# Patient Record
Sex: MALE | Race: WHITE | NOT HISPANIC OR LATINO | Employment: FULL TIME | URBAN - METROPOLITAN AREA
[De-identification: names, ages, dates, MRNs, and addresses within clinical notes are randomized per-mention and may not be internally consistent; named-entity substitution may affect disease eponyms.]

---

## 2017-04-02 ENCOUNTER — HOSPITAL ENCOUNTER (EMERGENCY)
Facility: HOSPITAL | Age: 37
Discharge: HOME | End: 2017-04-02
Attending: EMERGENCY MEDICINE | Admitting: EMERGENCY MEDICINE
Payer: COMMERCIAL

## 2017-04-02 VITALS
WEIGHT: 150.4 LBS | OXYGEN SATURATION: 100 % | TEMPERATURE: 98.5 F | HEART RATE: 80 BPM | DIASTOLIC BLOOD PRESSURE: 74 MMHG | RESPIRATION RATE: 18 BRPM | SYSTOLIC BLOOD PRESSURE: 131 MMHG

## 2017-04-02 DIAGNOSIS — M54.9 BACK PAIN: Primary | ICD-10-CM

## 2017-04-02 PROCEDURE — 99283 EMERGENCY DEPT VISIT LOW MDM: CPT

## 2017-04-02 RX ORDER — PREDNISONE 20 MG/1
60 TABLET ORAL ONCE
Status: COMPLETED | OUTPATIENT
Start: 2017-04-02 | End: 2017-04-02

## 2017-04-02 RX ORDER — IBUPROFEN 200 MG
200 TABLET ORAL EVERY 6 HOURS PRN
COMMUNITY

## 2017-04-02 RX ORDER — DIAZEPAM 5 MG/1
7.5 TABLET ORAL ONCE
Status: COMPLETED | OUTPATIENT
Start: 2017-04-02 | End: 2017-04-02

## 2017-04-02 RX ORDER — DIAZEPAM 5 MG/1
5 TABLET ORAL EVERY 8 HOURS PRN
Qty: 15 TABLET | Refills: 0 | Status: SHIPPED | OUTPATIENT
Start: 2017-04-02 | End: 2017-04-07

## 2017-04-02 RX ORDER — PREDNISONE 20 MG/1
20 TABLET ORAL 2 TIMES DAILY
Qty: 10 TABLET | Refills: 0 | Status: SHIPPED | OUTPATIENT
Start: 2017-04-02 | End: 2017-04-07

## 2017-04-02 RX ADMIN — DIAZEPAM 7.5 MG: 5 TABLET ORAL at 11:39

## 2017-04-02 RX ADMIN — PREDNISONE 60 MG: 20 TABLET ORAL at 11:38

## 2018-10-23 ENCOUNTER — HOSPITAL ENCOUNTER (EMERGENCY)
Facility: HOSPITAL | Age: 38
Discharge: HOME | End: 2018-10-23
Attending: EMERGENCY MEDICINE | Admitting: EMERGENCY MEDICINE

## 2018-10-23 VITALS
SYSTOLIC BLOOD PRESSURE: 127 MMHG | BODY MASS INDEX: 19.11 KG/M2 | RESPIRATION RATE: 18 BRPM | DIASTOLIC BLOOD PRESSURE: 67 MMHG | HEART RATE: 82 BPM | HEIGHT: 71 IN | OXYGEN SATURATION: 100 % | TEMPERATURE: 96.7 F | WEIGHT: 136.5 LBS

## 2018-10-23 DIAGNOSIS — Z59.00 HOMELESSNESS: Primary | ICD-10-CM

## 2018-10-23 PROCEDURE — 99284 EMERGENCY DEPT VISIT MOD MDM: CPT

## 2018-10-23 RX ORDER — ACETAMINOPHEN 325 MG/1
650 TABLET ORAL ONCE
Status: DISCONTINUED | OUTPATIENT
Start: 2018-10-23 | End: 2018-10-23 | Stop reason: HOSPADM

## 2018-10-23 SDOH — ECONOMIC STABILITY - HOUSING INSECURITY: HOMELESSNESS UNSPECIFIED: Z59.00

## 2018-10-23 NOTE — ED NOTES
Patient continuing to sleep in room  Respirations appear even and unlabored       Roberto Zee RN  10/23/18 9329

## 2018-10-23 NOTE — DISCHARGE INSTRUCTIONS
Please follow-up with the primary care provider for further care , if symptoms worsen please return to the emergency department    Paresthesia   WHAT YOU NEED TO KNOW:   Paresthesia is numbness and tingling  It can happen in any part of your body, but usually occurs in your legs, feet, arms, or hands  There are a large number of conditions that can cause paresthesia  It affects the nerves that provide sensation and happens when there are changes in nerves or nerve pathways  These changes can be temporary, such as if you take certain medicines or you are not getting enough vitamin B  Paresthesia can become permanent when there is nerve damage  Conditions that may cause nerve damage include diabetes, carpel tunnel syndrome, stroke, and multiple sclerosis  The exact cause of your paresthesia may be unknown  DISCHARGE INSTRUCTIONS:   Medicines:  Ask for more information about medicines you may need to treat the condition causing your paresthesias  · NSAIDs  help decrease swelling and pain or fever  This medicine is available with or without a doctor's order  NSAIDs can cause stomach bleeding or kidney problems in certain people  If you take blood thinner medicine, always ask your healthcare provider if NSAIDs are safe for you  Always read the medicine label and follow directions  · Take your medicine as directed  Contact your healthcare provider if you think your medicine is not helping or if you have side effects  Tell him or her if you are allergic to any medicine  Keep a list of the medicines, vitamins, and herbs you take  Include the amounts, and when and why you take them  Bring the list or the pill bottles to follow-up visits  Carry your medicine list with you in case of an emergency  Follow up with your healthcare provider or neurologist as directed:  Write down your questions so you remember to ask them during your visits    Contact your healthcare provider or neurologist if:   · Your symptoms do not improve  · You have symptoms in more than one part of your body  · You have questions about your condition or care  Return to the emergency department if:   · You have any of the following signs of a stroke:      ¨ Numbness or drooping on one side of your face     ¨ Weakness in an arm or leg    ¨ Confusion or difficulty speaking    ¨ Dizziness, a severe headache, or vision loss    · You are not able to control your urine or bowel movements  · You have severe pain along with numbness and tingling  · Your legs suddenly become cold  You have trouble moving your legs, and they ache  · You have increased weakness in a part of your body  · You have uncontrolled movements, or you have a seizure  © 2017 [address]  Information is for End User's use only and may not be sold, redistributed or otherwise used for commercial purposes  All illustrations and images included in CareNotes® are the copyrighted property of A D A M , Inc  or Mathew Osborne  The above information is an  only  It is not intended as medical advice for individual conditions or treatments  Talk to your doctor, nurse or pharmacist before following any medical regimen to see if it is safe and effective for you
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
## 2019-06-08 ENCOUNTER — TRANSCRIBE ORDERS (OUTPATIENT)
Dept: ADMINISTRATIVE | Facility: HOSPITAL | Age: 39
End: 2019-06-08

## 2019-06-08 ENCOUNTER — APPOINTMENT (OUTPATIENT)
Dept: LAB | Age: 39
End: 2019-06-08
Payer: COMMERCIAL

## 2019-06-08 DIAGNOSIS — F19.21 COMBINATIONS OF DRUG DEPENDENCE EXCLUDING OPIOID TYPE DRUG, IN REMISSION (HCC): ICD-10-CM

## 2019-06-08 DIAGNOSIS — Z20.5 EXPOSURE TO HEPATITIS B: ICD-10-CM

## 2019-06-08 DIAGNOSIS — Z00.00 ROUTINE GENERAL MEDICAL EXAMINATION AT A HEALTH CARE FACILITY: ICD-10-CM

## 2019-06-08 DIAGNOSIS — E55.9 AVITAMINOSIS D: ICD-10-CM

## 2019-06-08 DIAGNOSIS — Z00.00 ROUTINE GENERAL MEDICAL EXAMINATION AT A HEALTH CARE FACILITY: Primary | ICD-10-CM

## 2019-06-08 LAB
25(OH)D3 SERPL-MCNC: 18.7 NG/ML (ref 30–100)
ALBUMIN SERPL BCP-MCNC: 3.9 G/DL (ref 3.5–5)
ALP SERPL-CCNC: 128 U/L (ref 46–116)
ALT SERPL W P-5'-P-CCNC: 65 U/L (ref 12–78)
ANION GAP SERPL CALCULATED.3IONS-SCNC: 2 MMOL/L (ref 4–13)
AST SERPL W P-5'-P-CCNC: 28 U/L (ref 5–45)
BASOPHILS # BLD AUTO: 0.13 THOUSANDS/ΜL (ref 0–0.1)
BASOPHILS NFR BLD AUTO: 1 % (ref 0–1)
BILIRUB DIRECT SERPL-MCNC: 0.09 MG/DL (ref 0–0.2)
BILIRUB SERPL-MCNC: 0.28 MG/DL (ref 0.2–1)
BUN SERPL-MCNC: 21 MG/DL (ref 5–25)
CALCIUM SERPL-MCNC: 8.6 MG/DL (ref 8.3–10.1)
CHLORIDE SERPL-SCNC: 111 MMOL/L (ref 100–108)
CHOLEST SERPL-MCNC: 95 MG/DL (ref 50–200)
CO2 SERPL-SCNC: 28 MMOL/L (ref 21–32)
CREAT SERPL-MCNC: 1.05 MG/DL (ref 0.6–1.3)
EOSINOPHIL # BLD AUTO: 0.51 THOUSAND/ΜL (ref 0–0.61)
EOSINOPHIL NFR BLD AUTO: 4 % (ref 0–6)
ERYTHROCYTE [DISTWIDTH] IN BLOOD BY AUTOMATED COUNT: 13.1 % (ref 11.6–15.1)
GFR SERPL CREATININE-BSD FRML MDRD: 90 ML/MIN/1.73SQ M
GLUCOSE P FAST SERPL-MCNC: 125 MG/DL (ref 65–99)
HCT VFR BLD AUTO: 48.7 % (ref 36.5–49.3)
HDLC SERPL-MCNC: 36 MG/DL (ref 40–60)
HGB BLD-MCNC: 16.6 G/DL (ref 12–17)
IMM GRANULOCYTES # BLD AUTO: 0.04 THOUSAND/UL (ref 0–0.2)
IMM GRANULOCYTES NFR BLD AUTO: 0 % (ref 0–2)
LDLC SERPL CALC-MCNC: 29 MG/DL (ref 0–100)
LYMPHOCYTES # BLD AUTO: 2.1 THOUSANDS/ΜL (ref 0.6–4.47)
LYMPHOCYTES NFR BLD AUTO: 16 % (ref 14–44)
MCH RBC QN AUTO: 31.3 PG (ref 26.8–34.3)
MCHC RBC AUTO-ENTMCNC: 34.1 G/DL (ref 31.4–37.4)
MCV RBC AUTO: 92 FL (ref 82–98)
MONOCYTES # BLD AUTO: 0.96 THOUSAND/ΜL (ref 0.17–1.22)
MONOCYTES NFR BLD AUTO: 7 % (ref 4–12)
NEUTROPHILS # BLD AUTO: 9.76 THOUSANDS/ΜL (ref 1.85–7.62)
NEUTS SEG NFR BLD AUTO: 72 % (ref 43–75)
NONHDLC SERPL-MCNC: 59 MG/DL
NRBC BLD AUTO-RTO: 0 /100 WBCS
PLATELET # BLD AUTO: 186 THOUSANDS/UL (ref 149–390)
PMV BLD AUTO: 11.9 FL (ref 8.9–12.7)
POTASSIUM SERPL-SCNC: 4.6 MMOL/L (ref 3.5–5.3)
PROT SERPL-MCNC: 7 G/DL (ref 6.4–8.2)
RBC # BLD AUTO: 5.3 MILLION/UL (ref 3.88–5.62)
SODIUM SERPL-SCNC: 141 MMOL/L (ref 136–145)
TRIGL SERPL-MCNC: 151 MG/DL
WBC # BLD AUTO: 13.5 THOUSAND/UL (ref 4.31–10.16)

## 2019-06-08 PROCEDURE — 82306 VITAMIN D 25 HYDROXY: CPT

## 2019-06-08 PROCEDURE — 80053 COMPREHEN METABOLIC PANEL: CPT

## 2019-06-08 PROCEDURE — 36415 COLL VENOUS BLD VENIPUNCTURE: CPT

## 2019-06-08 PROCEDURE — 85025 COMPLETE CBC W/AUTO DIFF WBC: CPT

## 2019-06-08 PROCEDURE — 82248 BILIRUBIN DIRECT: CPT

## 2019-06-08 PROCEDURE — 80061 LIPID PANEL: CPT

## 2021-06-27 ENCOUNTER — OFFICE VISIT (OUTPATIENT)
Dept: URGENT CARE | Age: 41
End: 2021-06-27
Payer: COMMERCIAL

## 2021-06-27 VITALS
HEIGHT: 71 IN | DIASTOLIC BLOOD PRESSURE: 85 MMHG | RESPIRATION RATE: 18 BRPM | WEIGHT: 150 LBS | SYSTOLIC BLOOD PRESSURE: 134 MMHG | OXYGEN SATURATION: 96 % | HEART RATE: 83 BPM | TEMPERATURE: 97.9 F | BODY MASS INDEX: 21 KG/M2

## 2021-06-27 DIAGNOSIS — L23.7 POISON IVY DERMATITIS: Primary | ICD-10-CM

## 2021-06-27 PROCEDURE — G0382 LEV 3 HOSP TYPE B ED VISIT: HCPCS | Performed by: PHYSICIAN ASSISTANT

## 2021-06-27 RX ORDER — PREDNISONE 10 MG/1
TABLET ORAL
Qty: 30 TABLET | Refills: 0 | Status: SHIPPED | OUTPATIENT
Start: 2021-06-27
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
## 2021-06-27 NOTE — PATIENT INSTRUCTIONS
Continue to monitor symptoms  If new or worsening symptoms develop, go immediately to Er  Drink plenty of fluids  Follow up with Family Doctor this week  Poison Ivy   WHAT YOU NEED TO KNOW:   Poison ivy is a plant that can cause an itchy, uncomfortable rash on your skin  Poison ivy grows as a shrub or vine in woods, fields, and areas of thick [place]  It has 3 bright green leaves on each stem that turn red in jaimie  DISCHARGE INSTRUCTIONS:   Medicines:   · Antiseptic or drying creams or ointments: These medicines may be used to dry out the rash and decrease the itching  These products may be available without a doctor's order  · Steroids: This medicine helps decrease itching and inflammation  It can be given as a cream to apply to your skin or as a pill  · Antihistamines: This medicine may help decrease itching and help you sleep  It is available without a doctor's order  · Take your medicine as directed  Contact your healthcare provider if you think your medicine is not helping or if you have side effects  Tell him of her if you are allergic to any medicine  Keep a list of the medicines, vitamins, and herbs you take  Include the amounts, and when and why you take them  Bring the list or the pill bottles to follow-up visits  Carry your medicine list with you in case of an emergency  Follow up with your healthcare provider as directed:  Write down your questions so you remember to ask them during your visits  How your poison ivy rash spreads: You cannot spread poison ivy by touching your rash or the liquid from your blisters  Poison ivy is spread only if you scratch your skin while it still has oil on it  You may think your rash is spreading because new rashes appear over a number of days  This happens because areas covered by thin skin break out in a rash first  Your face or forearms may develop a rash before thicker areas, such as the palms of your hands     Self-care:   · Keep your rash clean and dry:  Wash it with soap and water  Gently pat it dry with a clean towel  · Try not to scratch or rub your rash: This can cause your skin to become infected  · Use a compress on your rash:  Dip a clean washcloth in cool water  Wring it out and place it on your rash  Leave the washcloth on your skin for 15 minutes  Do this at least 3 times per day  · Take a cornstarch or oatmeal bath: If your rash is too large to cover with wet washcloths, take 3 or 4 cornstarch baths daily  Mix 1 pound of cornstarch with a little water to make a paste  Add the paste to a tub full of water and mix well  You may also use colloidal oatmeal in the bath water  Use lukewarm water  Avoid hot water because it may cause your itching to increase  Prevent a poison ivy rash in the future:   · Wear skin protection:  Wear long pants, a long-sleeved shirt, and gloves  Use a skin block lotion to protect your skin from poison ivy oil  You can find this at a drugstore without a prescription  · Wash clothing after possible exposure: If you think you have been near a poison ivy plant, wash the clothes you were wearing separately from other clothes  Rinse the washing machine well after you take the clothes out  Scrub boots and shoes with warm, soapy water  Dry clean items and clothing that you cannot wash in water  Poison ivy oil is sticky and can stay on surfaces for a long time  It can cause a new rash even years later  · Bathe your pet:  Use warm water and shampoo on your pet's fur  This will prevent the spread of oil to your skin, car, and home  Wear long sleeves, long pants, and gloves while washing pets or any items that may have oil on them  · Reduce exposure to poison ivy:  Do not touch plants that look like poison ivy  Keep your yard free of poison ivy  While protecting your skin, remove the plant and the roots  Place them in a plastic bag and seal the bag tightly       · Do not burn poison ivy plants: This can spread the oil through the air  If you breathe the oil into your lungs, you could have swelling and serious breathing problems  Oil that clings to the fire maggie can land on your skin and cause a rash  Contact your healthcare provider if:   · You have pus, soft yellow scabs, or tenderness on the rash  · The itching gets worse or keeps you awake at night  · The rash covers more than 1/4 of your skin or spreads to your eyes, mouth, or genital area  · The rash is not better after 2 to 3 weeks  · You have tender, swollen glands on the sides of your neck  · You have swelling in your arms and legs  · You have questions or concerns about your condition or care  Return to the emergency department if:   · You have a fever  · You have redness, swelling, and tenderness around the rash  · You have trouble breathing  © Copyright [address] Information is for End User's use only and may not be sold, redistributed or otherwise used for commercial purposes  All illustrations and images included in CareNotes® are the copyrighted property of A D A M , Inc  or [facility] Michael Mendoza   The above information is an  only  It is not intended as medical advice for individual conditions or treatments  Talk to your doctor, nurse or pharmacist before following any medical regimen to see if it is safe and effective for you

## 2023-02-07 ENCOUNTER — HOSPITAL ENCOUNTER (INPATIENT)
Facility: HOSPITAL | Age: 43
LOS: 12 days | Discharge: HOME | End: 2023-02-20
Attending: EMERGENCY MEDICINE | Admitting: PSYCHIATRY & NEUROLOGY

## 2023-02-07 DIAGNOSIS — Z00.8 MEDICAL CLEARANCE FOR PSYCHIATRIC ADMISSION: ICD-10-CM

## 2023-02-07 DIAGNOSIS — F32.2 CURRENT SEVERE EPISODE OF MAJOR DEPRESSIVE DISORDER WITHOUT PSYCHOTIC FEATURES WITHOUT PRIOR EPISODE (HCC): Primary | ICD-10-CM

## 2023-02-07 DIAGNOSIS — F10.20 ALCOHOL USE DISORDER, SEVERE, DEPENDENCE (HCC): ICD-10-CM

## 2023-02-07 DIAGNOSIS — F32.A DEPRESSION: ICD-10-CM

## 2023-02-07 DIAGNOSIS — R45.89 THOUGHTS OF SELF HARM: ICD-10-CM

## 2023-02-07 LAB
AMPHETAMINES SERPL QL SCN: POSITIVE
BARBITURATES UR QL: NEGATIVE
BENZODIAZ UR QL: NEGATIVE
COCAINE UR QL: NEGATIVE
ETHANOL EXG-MCNC: 0 MG/DL
FLUAV RNA RESP QL NAA+PROBE: NEGATIVE
FLUBV RNA RESP QL NAA+PROBE: NEGATIVE
METHADONE UR QL: NEGATIVE
OPIATES UR QL SCN: NEGATIVE
OXYCODONE+OXYMORPHONE UR QL SCN: NEGATIVE
PCP UR QL: NEGATIVE
RSV RNA RESP QL NAA+PROBE: NEGATIVE
SARS-COV-2 RNA RESP QL NAA+PROBE: NEGATIVE
THC UR QL: POSITIVE

## 2023-02-07 RX ORDER — LORAZEPAM 1 MG/1
2 TABLET ORAL EVERY 2 HOUR PRN
Status: DISCONTINUED | OUTPATIENT
Start: 2023-02-07 | End: 2023-02-08

## 2023-02-07 RX ADMIN — LORAZEPAM 2 MG: 1 TABLET ORAL at 11:37

## 2023-02-07 RX ADMIN — LORAZEPAM 2 MG: 1 TABLET ORAL at 23:18
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
## 2023-02-08 LAB
FLUAV RNA RESP QL NAA+PROBE: NEGATIVE
FLUBV RNA RESP QL NAA+PROBE: NEGATIVE
RSV RNA RESP QL NAA+PROBE: NEGATIVE
SARS-COV-2 RNA RESP QL NAA+PROBE: NEGATIVE

## 2023-02-08 RX ORDER — BENZTROPINE MESYLATE 1 MG/1
1 TABLET ORAL 2 TIMES DAILY PRN
Status: DISCONTINUED | OUTPATIENT
Start: 2023-02-08 | End: 2023-02-20 | Stop reason: HOSPADM

## 2023-02-08 RX ORDER — BENZTROPINE MESYLATE 1 MG/ML
1 INJECTION INTRAMUSCULAR; INTRAVENOUS
Status: DISCONTINUED | OUTPATIENT
Start: 2023-02-08 | End: 2023-02-20 | Stop reason: HOSPADM

## 2023-02-08 RX ORDER — HALOPERIDOL 1 MG/1
1 TABLET ORAL EVERY 6 HOURS PRN
Status: DISCONTINUED | OUTPATIENT
Start: 2023-02-08 | End: 2023-02-20 | Stop reason: HOSPADM

## 2023-02-08 RX ORDER — HALOPERIDOL 5 MG/ML
2.5 INJECTION INTRAMUSCULAR
Status: DISCONTINUED | OUTPATIENT
Start: 2023-02-08 | End: 2023-02-20 | Stop reason: HOSPADM

## 2023-02-08 RX ORDER — NICOTINE 21 MG/24HR
14 PATCH, TRANSDERMAL 24 HOURS TRANSDERMAL ONCE
Status: COMPLETED | OUTPATIENT
Start: 2023-02-08 | End: 2023-02-09

## 2023-02-08 RX ORDER — MAGNESIUM HYDROXIDE/ALUMINUM HYDROXICE/SIMETHICONE 120; 1200; 1200 MG/30ML; MG/30ML; MG/30ML
30 SUSPENSION ORAL EVERY 4 HOURS PRN
Status: DISCONTINUED | OUTPATIENT
Start: 2023-02-08 | End: 2023-02-20 | Stop reason: HOSPADM

## 2023-02-08 RX ORDER — BENZTROPINE MESYLATE 1 MG/ML
0.5 INJECTION INTRAMUSCULAR; INTRAVENOUS
Status: DISCONTINUED | OUTPATIENT
Start: 2023-02-08 | End: 2023-02-20 | Stop reason: HOSPADM

## 2023-02-08 RX ORDER — PROPRANOLOL HYDROCHLORIDE 10 MG/1
10 TABLET ORAL EVERY 8 HOURS PRN
Status: DISCONTINUED | OUTPATIENT
Start: 2023-02-08 | End: 2023-02-20 | Stop reason: HOSPADM

## 2023-02-08 RX ORDER — LORAZEPAM 1 MG/1
2 TABLET ORAL ONCE
Status: COMPLETED | OUTPATIENT
Start: 2023-02-08 | End: 2023-02-08

## 2023-02-08 RX ORDER — AMOXICILLIN 250 MG
1 CAPSULE ORAL DAILY PRN
Status: DISCONTINUED | OUTPATIENT
Start: 2023-02-08 | End: 2023-02-20 | Stop reason: HOSPADM

## 2023-02-08 RX ORDER — HYDROXYZINE 50 MG/1
50 TABLET, FILM COATED ORAL
Status: DISCONTINUED | OUTPATIENT
Start: 2023-02-08 | End: 2023-02-20 | Stop reason: HOSPADM

## 2023-02-08 RX ORDER — LORAZEPAM 2 MG/ML
2 INJECTION INTRAMUSCULAR
Status: DISCONTINUED | OUTPATIENT
Start: 2023-02-08 | End: 2023-02-20 | Stop reason: HOSPADM

## 2023-02-08 RX ORDER — HYDROXYZINE 50 MG/1
100 TABLET, FILM COATED ORAL
Status: DISCONTINUED | OUTPATIENT
Start: 2023-02-08 | End: 2023-02-20 | Stop reason: HOSPADM

## 2023-02-08 RX ORDER — LANOLIN ALCOHOL/MO/W.PET/CERES
3 CREAM (GRAM) TOPICAL
Status: DISCONTINUED | OUTPATIENT
Start: 2023-02-08 | End: 2023-02-20 | Stop reason: HOSPADM

## 2023-02-08 RX ORDER — BENZTROPINE MESYLATE 1 MG/ML
1 INJECTION INTRAMUSCULAR; INTRAVENOUS 2 TIMES DAILY PRN
Status: DISCONTINUED | OUTPATIENT
Start: 2023-02-08 | End: 2023-02-20 | Stop reason: HOSPADM

## 2023-02-08 RX ORDER — DIPHENHYDRAMINE HYDROCHLORIDE 50 MG/ML
50 INJECTION INTRAMUSCULAR; INTRAVENOUS EVERY 6 HOURS PRN
Status: DISCONTINUED | OUTPATIENT
Start: 2023-02-08 | End: 2023-02-20 | Stop reason: HOSPADM

## 2023-02-08 RX ORDER — ACETAMINOPHEN 325 MG/1
650 TABLET ORAL EVERY 4 HOURS PRN
Status: DISCONTINUED | OUTPATIENT
Start: 2023-02-08 | End: 2023-02-20 | Stop reason: HOSPADM

## 2023-02-08 RX ORDER — HYDROXYZINE HYDROCHLORIDE 25 MG/1
25 TABLET, FILM COATED ORAL
Status: DISCONTINUED | OUTPATIENT
Start: 2023-02-08 | End: 2023-02-20 | Stop reason: HOSPADM

## 2023-02-08 RX ORDER — LORAZEPAM 2 MG/ML
2 INJECTION INTRAMUSCULAR EVERY 6 HOURS PRN
Status: DISCONTINUED | OUTPATIENT
Start: 2023-02-08 | End: 2023-02-20 | Stop reason: HOSPADM

## 2023-02-08 RX ORDER — LORAZEPAM 2 MG/ML
1 INJECTION INTRAMUSCULAR
Status: DISCONTINUED | OUTPATIENT
Start: 2023-02-08 | End: 2023-02-20 | Stop reason: HOSPADM

## 2023-02-08 RX ORDER — HALOPERIDOL 5 MG/1
5 TABLET ORAL
Status: DISCONTINUED | OUTPATIENT
Start: 2023-02-08 | End: 2023-02-20 | Stop reason: HOSPADM

## 2023-02-08 RX ORDER — POLYETHYLENE GLYCOL 3350 17 G/17G
17 POWDER, FOR SOLUTION ORAL DAILY PRN
Status: DISCONTINUED | OUTPATIENT
Start: 2023-02-08 | End: 2023-02-09

## 2023-02-08 RX ORDER — ACETAMINOPHEN 325 MG/1
650 TABLET ORAL EVERY 6 HOURS PRN
Status: DISCONTINUED | OUTPATIENT
Start: 2023-02-08 | End: 2023-02-20 | Stop reason: HOSPADM

## 2023-02-08 RX ORDER — ACETAMINOPHEN 325 MG/1
975 TABLET ORAL EVERY 6 HOURS PRN
Status: DISCONTINUED | OUTPATIENT
Start: 2023-02-08 | End: 2023-02-20 | Stop reason: HOSPADM

## 2023-02-08 RX ORDER — HALOPERIDOL 5 MG/ML
5 INJECTION INTRAMUSCULAR
Status: DISCONTINUED | OUTPATIENT
Start: 2023-02-08 | End: 2023-02-20 | Stop reason: HOSPADM

## 2023-02-08 RX ADMIN — Medication 3 MG: at 21:44

## 2023-02-08 RX ADMIN — ACETAMINOPHEN 975 MG: 325 TABLET ORAL at 21:45

## 2023-02-08 RX ADMIN — NICOTINE 14 MG: 14 PATCH, EXTENDED RELEASE TRANSDERMAL at 10:34

## 2023-02-08 RX ADMIN — LORAZEPAM 2 MG: 1 TABLET ORAL at 10:33

## 2023-02-08 RX ADMIN — LORAZEPAM 2 MG: 1 TABLET ORAL at 21:44

## 2023-02-08 NOTE — ED NOTES
Pt is cooperative with staff and has no thoughts of hurting himself  Currently talking to psychiatry         Robbi Dean RN  02/08/23 5879

## 2023-02-08 NOTE — PROGRESS NOTES
Progress Note - Behavioral Health   Chloe Reyes 43 y o  male MRN: [id number]  Unit/Bed#: E8E2 Encounter: [id number]    Assessment and Plan     Chloe Reyes is a 43 y o  male male with a past psychiatric history of alcohol abuse who presents to the ED for worsening depression and thoughts of self-harm  Today, patient continues to endorse passive SI and depression  Patient denies any homicidal ideation, auditory visual hallucinations  Patient reports decreased thoughts of self-harm today  Patient contracts to safety in the ED  Diagnosis: Unspecified mood disorder and alcohol use disorder versus alcohol-induced mood disorder    Recommended Treatment:   • Patient currently does meet criteria for inpatient psychiatric hospitalization: patient is currently at potential risk of harming self or others  • 201 Signed, patient accepted for inpatient placement  • Recommend switching from 1: 1 continuous chew monitoring to q 7 checks  • Continue CIWA protocol, if elevated recommend consultation for medical detox   • Continue Ativan 2 mg q2h prn, for anxiety and withdrawal symptoms  • No other psychiatric medication recommendations at this time as patient is awaiting placement at an inpatient psychiatric facility  • Safety plan discussed with patient  • Psychiatry will continue to follow  Please call or TigerText the on-call team ([facility]: [phone]) with any questions or concerns  Angelica Grissom is a 43 y o  male with male with a past psychiatric history of alcohol abuse who presents to the ED for worsening depression and thoughts of self-harm  Pt was seen and evaluated for length of stay  Today, the patient endorses feeling agitated, anxious, and frustrated this morning  Pt expressed concerns of "people aren't paying any attention to me", stating it has been a little better this morning  Pt endorses feelings of withdrawal from cigarettes as his usual usage is one pack per day   Pt provided nicotine patch, states symptoms improved  Pt endorses nausea and decreased appetite, denies vomiting, diarrhea, tremors, HA  Pt states after he receives the ativan symptoms of agitation and withdrawal subside  Pt educated on withdrawal s/ and ativan as needed medication orders  Pt endorses passive SI, denies HI, denies AH/VH  Pt endorses thoughts of self-harm, but has not acted on them  Patient contracted for safety  UDS positive for amphetamine and THC, patient endorsed usage of Adderall and marijuana nightly  Bed placement pending  Behavior over the last 24 hours: endorses increased agitation   Sleep: frequent awakenings  Appetite: improving  Medication side effects: none verbalized  ROS: nausea, GI upset, Complete review of systems is negative except as noted above  Mental Status Exam      Appearance:  alert, good eye contact and appears stated age   Behavior:  calm and cooperative   Motor: no abnormal movements and normal gait and balance   Speech:  spontaneous and coherent   Mood:  depressed, anxious and irritable   Affect:  constricted   Thought Process:  Organized, logical, goal-directed   Thought Content: no verbalized delusions or overt paranoia   Perceptual disturbances: no reported hallucinations and does not appear to be responding to internal stimuli at this time   Risk Potential: No active homicidal ideation, Passive death wishes   Cognition: oriented to self and situation, appears to be of average intelligence and cognition not formally tested   Insight:  Limited   Judgment: Poor     Risks, benefits and possible side effects of Medications:   Risks, benefits, and possible side effects of medications have been explained to the patient, who verbalizes understanding  Labs: I have personally reviewed all pertinent laboratory results  This note was not shared with the patient due to this is a psychotherapy note    This note was created using dictation system   There may be translation, syntax, or grammatical errors  If you have any questions, please contact the dictating provider      Edith Montana, MS3  Stan Hurtado MD  [facility] Res[facility], PGY-I

## 2023-02-08 NOTE — PLAN OF CARE
Problem: Nutrition/Hydration-ADULT  Goal: Nutrient/Hydration intake appropriate for improving, restoring or maintaining nutritional needs  Description: Monitor and assess patient's nutrition/hydration status for malnutrition  Collaborate with interdisciplinary team and initiate plan and interventions as ordered  Monitor patient's weight and dietary intake as ordered or per policy  Utilize nutrition screening tool and intervene as necessary  Determine patient's food preferences and provide high-protein, high-caloric foods as appropriate       INTERVENTIONS:  - Monitor oral intake, urinary output, labs, and treatment plans  - Assess nutrition and hydration status and recommend course of action  - Evaluate amount of meals eaten  - Assist patient with eating if necessary   - Allow adequate time for meals  - Recommend/ encourage appropriate diets, oral nutritional supplements, and vitamin/mineral supplements  - Order, calculate, and assess calorie counts as needed  - Recommend, monitor, and adjust tube feedings and TPN/PPN based on assessed needs  - Assess need for intravenous fluids  - Provide specific nutrition/hydration education as appropriate  - Include patient/family/caregiver in decisions related to nutrition  Outcome: Not Progressing     Problem: Ineffective Coping  Goal: Cooperates with admission process  Description: Interventions:   - Complete admission process  Outcome: Not Progressing  Goal: Identifies ineffective coping skills  Outcome: Not Progressing  Goal: Identifies healthy coping skills  Outcome: Not Progressing  Goal: Demonstrates healthy coping skills  Outcome: Not Progressing  Goal: Participates in unit activities  Description: Interventions:  - Provide therapeutic environment   - Provide required programming   - Redirect inappropriate behaviors   Outcome: Not Progressing  Goal: Patient/Family participate in treatment and DC plans  Description: Interventions:  - Provide therapeutic environment  Outcome: Not Progressing     Problem: Risk for Self Injury/Neglect  Goal: Treatment Goal: Remain safe during length of stay, learn and adopt new coping skills, and be free of self-injurious ideation, impulses and acts at the time of discharge  Outcome: Not Progressing  Goal: Verbalize thoughts and feelings  Description: Interventions:  - Assess and re-assess patient's lethality and potential for self-injury  - Engage patient in 1:1 interactions, daily, for a minimum of 15 minutes  - Encourage patient to express feelings, fears, frustrations, hopes  - Establish rapport/trust with patient   Outcome: Not Progressing  Goal: Refrain from harming self  Description: Interventions:  - Monitor patient closely, per order  - Develop a trusting relationship  - Supervise medication ingestion, monitor effects and side effects   Outcome: Not Progressing  Goal: Attend and participate in unit activities, including therapeutic, recreational, and educational groups  Description: Interventions:  - Provide therapeutic and educational activities daily, encourage attendance and participation, and document same in the medical record  - Obtain collateral information, encourage visitation and family involvement in care   Outcome: Not Progressing  Goal: Recognize maladaptive responses and adopt new coping mechanisms  Outcome: Not Progressing  Goal: Complete daily ADLs, including personal hygiene independently, as able  Description: Interventions:  - Observe, teach, and assist patient with ADLS  - Monitor and promote a balance of rest/activity, with adequate nutrition and elimination  Outcome: Not Progressing     Problem: Depression  Goal: Treatment Goal: Demonstrate behavioral control of depressive symptoms, verbalize feelings of improved mood/affect, and adopt new coping skills prior to discharge  Outcome: Not Progressing  Goal: Verbalize thoughts and feelings  Description: Interventions:  - Assess and re-assess patient's level of risk   - Engage patient in 1:1 interactions, daily, for a minimum of 15 minutes   - Encourage patient to express feelings, fears, frustrations, hopes   Outcome: Not Progressing  Goal: Refrain from harming self  Description: Interventions:  - Monitor patient closely, per order   - Supervise medication ingestion, monitor effects and side effects   Outcome: Not Progressing  Goal: Refrain from isolation  Description: Interventions:  - Develop a trusting relationship   - Encourage socialization   Outcome: Not Progressing  Goal: Refrain from self-neglect  Outcome: Not Progressing  Goal: Attend and participate in unit activities, including therapeutic, recreational, and educational groups  Description: Interventions:  - Provide therapeutic and educational activities daily, encourage attendance and participation, and document same in the medical record   Outcome: Not Progressing  Goal: Complete daily ADLs, including personal hygiene independently, as able  Description: Interventions:  - Observe, teach, and assist patient with ADLS  -  Monitor and promote a balance of rest/activity, with adequate nutrition and elimination   Outcome: Not Progressing

## 2023-02-08 NOTE — ED NOTES
Patient is accepted at [facility]  Patient is accepted by Dr Ludivina Frazier     Patient may go to the floor at **  Nurse report is to be called to x [phone]  prior to patient transfer

## 2023-02-08 NOTE — ED NOTES
Pt stated stomach was hurting because he was hungry and nothing was offered before  A sandwich, pretzels and ginger ale given  All plastic removed    Pt cooperative and appreciative       Devin Gonzalez, RN  02/08/23 3621

## 2023-02-08 NOTE — ED NOTES
Pt received sleeping in bed, in the [address], respirations are even and unlabored, with no signs of distress or discomfort  On a virtual at this time, will continue to monitor         Diego Lutz RN  02/08/23 9428

## 2023-02-08 NOTE — ED NOTES
Pt appears anxious in bed  No sweats just appears sad  Pt stated that he has not had a cigarette since he came in and felt the urge to smoke   Willing to get a nicotine patch and shon Gaytan RN  02/08/23 5713

## 2023-02-09 PROBLEM — F10.20 ALCOHOL USE DISORDER, SEVERE, DEPENDENCE (HCC): Status: ACTIVE | Noted: 2023-02-09

## 2023-02-09 PROBLEM — B18.2 CHRONIC HEPATITIS C WITHOUT HEPATIC COMA (HCC): Status: ACTIVE | Noted: 2023-02-09

## 2023-02-09 PROBLEM — F32.2 CURRENT SEVERE EPISODE OF MAJOR DEPRESSIVE DISORDER WITHOUT PSYCHOTIC FEATURES WITHOUT PRIOR EPISODE (HCC): Status: ACTIVE | Noted: 2023-02-09

## 2023-02-09 PROBLEM — Z00.8 MEDICAL CLEARANCE FOR PSYCHIATRIC ADMISSION: Status: ACTIVE | Noted: 2023-02-09

## 2023-02-09 LAB
25(OH)D3 SERPL-MCNC: 14.4 NG/ML (ref 30–100)
ALBUMIN SERPL BCP-MCNC: 3.6 G/DL (ref 3.5–5)
ALP SERPL-CCNC: 74 U/L (ref 43–122)
ALT SERPL W P-5'-P-CCNC: 73 U/L
ANION GAP SERPL CALCULATED.3IONS-SCNC: 1 MMOL/L (ref 5–14)
AST SERPL W P-5'-P-CCNC: 68 U/L (ref 17–59)
BASOPHILS # BLD AUTO: 0.09 THOUSANDS/ÂΜL (ref 0–0.1)
BASOPHILS NFR BLD AUTO: 1 % (ref 0–1)
BILIRUB SERPL-MCNC: 0.61 MG/DL (ref 0.2–1)
BUN SERPL-MCNC: 12 MG/DL (ref 5–25)
CALCIUM SERPL-MCNC: 8.9 MG/DL (ref 8.4–10.2)
CHLORIDE SERPL-SCNC: 107 MMOL/L (ref 96–108)
CHOLEST SERPL-MCNC: 140 MG/DL
CO2 SERPL-SCNC: 28 MMOL/L (ref 21–32)
CREAT SERPL-MCNC: 0.69 MG/DL (ref 0.7–1.5)
EOSINOPHIL # BLD AUTO: 0.3 THOUSAND/ÂΜL (ref 0–0.61)
EOSINOPHIL NFR BLD AUTO: 4 % (ref 0–6)
ERYTHROCYTE [DISTWIDTH] IN BLOOD BY AUTOMATED COUNT: 12.2 % (ref 11.6–15.1)
FOLATE SERPL-MCNC: 16.6 NG/ML (ref 3.1–17.5)
GFR SERPL CREATININE-BSD FRML MDRD: 117 ML/MIN/1.73SQ M
GLUCOSE P FAST SERPL-MCNC: 100 MG/DL (ref 70–99)
GLUCOSE SERPL-MCNC: 100 MG/DL (ref 70–99)
HCT VFR BLD AUTO: 44.5 % (ref 36.5–49.3)
HDLC SERPL-MCNC: 83 MG/DL
HGB BLD-MCNC: 15.1 G/DL (ref 12–17)
IMM GRANULOCYTES # BLD AUTO: 0.02 THOUSAND/UL (ref 0–0.2)
IMM GRANULOCYTES NFR BLD AUTO: 0 % (ref 0–2)
LDLC SERPL CALC-MCNC: 50 MG/DL
LYMPHOCYTES # BLD AUTO: 1.62 THOUSANDS/ÂΜL (ref 0.6–4.47)
LYMPHOCYTES NFR BLD AUTO: 23 % (ref 14–44)
MCH RBC QN AUTO: 33 PG (ref 26.8–34.3)
MCHC RBC AUTO-ENTMCNC: 33.9 G/DL (ref 31.4–37.4)
MCV RBC AUTO: 97 FL (ref 82–98)
MONOCYTES # BLD AUTO: 0.86 THOUSAND/ÂΜL (ref 0.17–1.22)
MONOCYTES NFR BLD AUTO: 12 % (ref 4–12)
NEUTROPHILS # BLD AUTO: 4.14 THOUSANDS/ÂΜL (ref 1.85–7.62)
NEUTS SEG NFR BLD AUTO: 60 % (ref 43–75)
NONHDLC SERPL-MCNC: 57 MG/DL
NRBC BLD AUTO-RTO: 0 /100 WBCS
PLATELET # BLD AUTO: 154 THOUSANDS/UL (ref 149–390)
PMV BLD AUTO: 10.8 FL (ref 8.9–12.7)
POTASSIUM SERPL-SCNC: 4.2 MMOL/L (ref 3.5–5.3)
PROT SERPL-MCNC: 6.5 G/DL (ref 6.4–8.4)
RBC # BLD AUTO: 4.58 MILLION/UL (ref 3.88–5.62)
SODIUM SERPL-SCNC: 136 MMOL/L (ref 135–147)
TRIGL SERPL-MCNC: 36 MG/DL
TSH SERPL DL<=0.05 MIU/L-ACNC: 4.27 UIU/ML (ref 0.45–4.5)
VIT B12 SERPL-MCNC: 454 PG/ML (ref 100–900)
WBC # BLD AUTO: 7.03 THOUSAND/UL (ref 4.31–10.16)

## 2023-02-09 RX ORDER — LANOLIN ALCOHOL/MO/W.PET/CERES
100 CREAM (GRAM) TOPICAL DAILY
Status: DISCONTINUED | OUTPATIENT
Start: 2023-02-09 | End: 2023-02-20 | Stop reason: HOSPADM

## 2023-02-09 RX ORDER — POLYETHYLENE GLYCOL 3350 17 G/17G
17 POWDER, FOR SOLUTION ORAL DAILY PRN
Status: DISCONTINUED | OUTPATIENT
Start: 2023-02-09 | End: 2023-02-20 | Stop reason: HOSPADM

## 2023-02-09 RX ORDER — MIRTAZAPINE 15 MG/1
15 TABLET, FILM COATED ORAL
Status: DISCONTINUED | OUTPATIENT
Start: 2023-02-09 | End: 2023-02-12

## 2023-02-09 RX ORDER — NICOTINE 21 MG/24HR
1 PATCH, TRANSDERMAL 24 HOURS TRANSDERMAL DAILY
Status: DISCONTINUED | OUTPATIENT
Start: 2023-02-09 | End: 2023-02-09

## 2023-02-09 RX ORDER — TRAZODONE HYDROCHLORIDE 50 MG/1
50 TABLET ORAL
Status: DISCONTINUED | OUTPATIENT
Start: 2023-02-09 | End: 2023-02-20 | Stop reason: HOSPADM

## 2023-02-09 RX ORDER — FOLIC ACID 1 MG/1
1 TABLET ORAL DAILY
Status: DISCONTINUED | OUTPATIENT
Start: 2023-02-09 | End: 2023-02-20 | Stop reason: HOSPADM

## 2023-02-09 RX ORDER — NICOTINE 21 MG/24HR
21 PATCH, TRANSDERMAL 24 HOURS TRANSDERMAL DAILY
Status: DISCONTINUED | OUTPATIENT
Start: 2023-02-09 | End: 2023-02-20 | Stop reason: HOSPADM

## 2023-02-09 RX ADMIN — HALOPERIDOL 1 MG: 1 TABLET ORAL at 10:18

## 2023-02-09 RX ADMIN — FOLIC ACID 1 MG: 1 TABLET ORAL at 14:05

## 2023-02-09 RX ADMIN — THIAMINE HCL TAB 100 MG 100 MG: 100 TAB at 14:05

## 2023-02-09 RX ADMIN — MIRTAZAPINE 15 MG: 15 TABLET, FILM COATED ORAL at 21:01

## 2023-02-09 RX ADMIN — NICOTINE 21 MG: 21 PATCH, EXTENDED RELEASE TRANSDERMAL at 14:47

## 2023-02-09 RX ADMIN — MULTIPLE VITAMINS W/ MINERALS TAB 1 TABLET: TAB ORAL at 14:05

## 2023-02-09 RX ADMIN — NICOTINE 1 PATCH: 14 PATCH, EXTENDED RELEASE TRANSDERMAL at 14:21

## 2023-02-09 NOTE — PROGRESS NOTES
02/09/23 0948   Activity/Group Checklist   Group Community meeting   Attendance Attended   Attendance Duration (min) 16-30   Interactions Interacted appropriately   Affect/Mood Blunted/flat   Goals Achieved Able to listen to others; Able to engage in interactions; Able to self-disclose; Able to recieve feedback     Patient was able to offer support to his peers even though he does not believe he can initiate a conversation with another
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
## 2023-02-09 NOTE — TREATMENT PLAN
TREATMENT PLAN REVIEW - Behavioral Health Lillian Cruz 43 y o  1980 male MRN: [id number]    [address] [address] Room / Bed: [address] [address]/[facility] 280-36 Encounter: [id number]        Admit Date/Time:  2/7/2023  9:38 AM    Treatment Team: Attending Provider: Swati Guerrero MD; Care Manager: Eyad Mueller [facility]; Licensed Practical Nurse: Yolanda Trent LPN; Patient Care Assistant: Jaiden Link    Diagnosis: Active Problems:    Medical clearance for psychiatric admission    Chronic hepatitis C without hepatic coma ([facility] [address] )      Patient Strengths/Assets: cooperative, communication skills, family ties, interpersonal skills, motivation for treatment/growth, negotiates basic needs, supportive family      Patient Barriers/Limitations: chronic mental illness, low self esteem, noncompliant with treatment, poor insight, self-care deficit    Short Term Goals: decrease in depressive symptoms, decrease in anxiety symptoms, decrease in suicidal thoughts, ability to stay safe on the unit, ability to stay free from restraints, improvement in reality testing, improvement in self care, sleep improvement, improvement in appetite, tolerate medications, increase in group attendance, increase in group participation, increase in socialization with peers on the unit    Long Term Goals: improvement in depression, improvement in anxiety, stabilization of mood, free of suicidal thoughts, free of homicidal thoughts, no self abusive behavior, improved impulse control, improved insight, no agitation on the unit, no aggressive behavior on the unit, adequate self care, stable living arrangements upon discharge, establishment of family support upon discharge    Progress Towards Goals: starting psychiatric medications as prescribed, progressing    Recommended Treatment: medication management, patient medication education, group therapy, milieu therapy, continued Behavioral Health psychiatric evaluation/assessment process     Treatment Frequency: daily medication monitoring, group and milieu therapy daily, monitoring through interdisciplinary rounds, monitoring through weekly patient care conferences    Expected Discharge Date: 10 days - 2/19/2023    Discharge Plan: referrals as indicated    Treatment Plan Created/Updated By: Laurie Keith DO

## 2023-02-09 NOTE — NURSING NOTE
Patient woke up and came to nurse's station c/o sweats, severe anxiety, and generalized body pain  Patient stated, "I don't understand, but all the sudden I feel like complete shit " CIWA was was a 13 at this time  Dr Marshall He was contacted for one time order of Ativan 2mg PO, which was administered  Att his time, PRN tylenol 975mg was administered for 10/10 generalized pain  PRN melatonin 3mg PO was also administered for difficulty sleeping  Will monitor for effectiveness
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
## 2023-02-09 NOTE — NURSING NOTE
Pt awake and irritable at start of shift, unhappy with menu choices  Pt visible intermittently  Appears sad, depressed  Pt reports agitation, CIWA=19  PRN Haldol 1mg requested and received at 1018 for mild agitation with positive effect  Reports depression d/t being in tx and issues at home  Reports family is supportive and states "I do want to stop" referring to his alcohol consumption  Reports poor sleep, stating he slept about 3 hours last night, and typically struggles with falling and staying asleep  Pt otherwise appears to be adjusting well to unit  Attending groups  Denies SI/HI/AVH at this time  Denies any unmet needs  Will monitor

## 2023-02-09 NOTE — ASSESSMENT & PLAN NOTE
Patient is medically cleared for admission to the [facility] for treatment of the underlying psychiatric illness  VSS  Reviewed CMP, CBC, and Lipid panel  All other admission labs pending at the time of consultation  SLIM will sign off   Please call with questions or concerns
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
## 2023-02-09 NOTE — PROGRESS NOTES
02/09/23 0823   Team Meeting   Meeting Type Daily Rounds   Team Members Present   Team Members Present Physician;Nurse;   Physician Team Member [facility]   [facility]urs[facility] Team [facility]   Care Management Team Member Pam   Patient/Family Present   Patient Present No   Patient's Family Present No   Readmit score 12  Pt is a new admit here from [facility] ED fo increasing ETOH abuse, increasing SI  Pt presented at [facility] ED a few days ago after hitting his head on a wall while intoxicated  Pt placed on CIWA protocol  Discharge to be determined

## 2023-02-09 NOTE — PROGRESS NOTES
RING Group Note     02/09/23 1415   Activity/Group Checklist   Group Life Skills  (Anger/Anxiety Board Game)   Attendance Attended   Attendance Duration (min) 46-60   Interactions Interacted appropriately   Affect/Mood Appropriate;Bright   Goals Achieved Identified feelings; Identified triggers; Discussed coping strategies; Able to listen to others; Able to engage in interactions; Able to reflect/comment on own behavior;Able to self-disclose; Able to recieve feedback; Able to give feedback to another  (Identified Ineffective Coping Skills)

## 2023-02-09 NOTE — PROGRESS NOTES
Met with Ronal Taylor completed his admission self assessment  He stated that his stressors are drinking (1/5th of [place] and a 6 pack of beer daily), stress from work as an  at [employer], home life  He does not believe he can handle the basics of life and he feels like a failure  He has been in rehab in the past and had 7 months clean  He presents as depressed

## 2023-02-09 NOTE — NURSING NOTE
On reassessment, patient reports PRN medication was effective at reducing his symptoms and he was able to fall back to sleep and not in any discomfort  Ativan 2mg, tylenol 975mg, and melatonin 3mg were effective

## 2023-02-10 PROBLEM — E43 SEVERE PROTEIN-CALORIE MALNUTRITION (HCC): Status: ACTIVE | Noted: 2023-02-10

## 2023-02-10 RX ADMIN — THIAMINE HCL TAB 100 MG 100 MG: 100 TAB at 08:43

## 2023-02-10 RX ADMIN — NICOTINE 21 MG: 21 PATCH, EXTENDED RELEASE TRANSDERMAL at 08:43

## 2023-02-10 RX ADMIN — MIRTAZAPINE 15 MG: 15 TABLET, FILM COATED ORAL at 21:11

## 2023-02-10 RX ADMIN — MULTIPLE VITAMINS W/ MINERALS TAB 1 TABLET: TAB ORAL at 08:43

## 2023-02-10 RX ADMIN — FOLIC ACID 1 MG: 1 TABLET ORAL at 08:43

## 2023-02-10 RX ADMIN — ACETAMINOPHEN 650 MG: 325 TABLET ORAL at 08:58

## 2023-02-10 NOTE — PROGRESS NOTES
02/10/23 1238   Team Meeting   Meeting Type Tx Team Meeting   Team Members Present   Team Members Present Physician;Nurse;   Physician Team Member Elvie Domínguez   Nursing Team Member [facility] Management Team Member [place]   Patient/Family Present   Patient Present Yes   Patient's Family Present No     Pt seen for tx team meeting  Pt educated on med management, case management and group therapy  Pt pleasant and cooperative  Pt asking about LOS  Pt educated on average LOS and medication adjustments  PT verbalized understanding  Tx plan reviewed and signed

## 2023-02-10 NOTE — NURSING NOTE
Pt calm/cooperative with staff  Denies feeling agitated or anxious  Compliant with HS med  Social with peers  Denies SI/HI/AVH  CIWA 4 at start of shift

## 2023-02-10 NOTE — NURSING NOTE
Pt has been pleasant and cooperative throughout the day  He is visibile on the unit and social with his peers  Pt denies any issues with sleep or appetite  He rates his depression and anxiety 4/10  Pt is compliant with all meals and medications  Pt denies any SI/HI/AH/VH  Staff availability reinforced

## 2023-02-10 NOTE — PROGRESS NOTES
02/10/23 1300   Activity/Group Checklist   Group   (recovery group)   Attendance Attended   Attendance Duration (min) 46-60   Interactions Interacted appropriately   Affect/Mood Appropriate   Goals Achieved Discussed coping strategies; Discussed self-esteem issues; Able to listen to others; Able to engage in interactions; Able to reflect/comment on own behavior;Able to self-disclose; Able to recieve feedback; Able to give feedback to another
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
## 2023-02-10 NOTE — PROGRESS NOTES
Progress Note - Behavioral Health   Josefa Novoa 43 y o  male MRN: [id number]  Unit/Bed#: [facility] 345-02 Encounter: [id number]    Assessment/Plan   Principal Problem:    Current severe episode of major depressive disorder without psychotic features without prior episode ([facility] )  Active Problems:    Alcohol use disorder, severe, dependence ([facility] )    Chronic hepatitis C without hepatic coma ([facility] )    Medical clearance for psychiatric admission    Severe protein-calorie malnutrition ([facility] )      Subjective: Patient was seen, chart was reviewed, and case was discussed with entire team    Patient was seen with resident in therapy room  Patient is rather anxious and restless however he remains calm and cooperative  Patient reports that once he got asleep he did well overnight  He reports that this morning he was a little disoriented and rather irritable with all the voices of people around him  He was also frustrated with his situation with contact lenses that she believes were thrown away however new ones are brought to him this morning  Patient feels he has been eating adequately since here  Patient does feel his depression is improving  Patient reports that he may have been on Remeron previously and is still agreeable to using it  Does not feel that he is experiencing any withdrawal symptoms at this time  Patient does not have thoughts of suicide active or passive  Patient reports that on his first day here and possibly yesterday he was having some thoughts to actually harm himself such as hitting himself but does not feel like that today  Patient is tolerating the Remeron  He has been medication compliant  Patient does try to explain that he he wants to stop drinking and is willing to look at options  He feels mostly he needs a therapist someone to talk to about his problems which he only defaults to treating with alcohol          Behavior over the last 24 hours: Improving  Sleep: Improving  Appetite: Improving and adequate  Medication side effects: None verbalized    Medical ROS: Pertinent items are noted in HPI no complaints    Current Medications:  Current Facility-Administered Medications   Medication Dose Route Frequency   • acetaminophen (TYLENOL) tablet 650 mg  650 mg Oral Q6H PRN   • acetaminophen (TYLENOL) tablet 650 mg  650 mg Oral Q4H PRN   • acetaminophen (TYLENOL) tablet 975 mg  975 mg Oral Q6H PRN   • aluminum-magnesium hydroxide-simethicone (MYLANTA) oral suspension 30 mL  30 mL Oral Q4H PRN   • haloperidol lactate (HALDOL) injection 2 5 mg  2 5 mg Intramuscular Q4H PRN Max 4/day    And   • LORazepam (ATIVAN) injection 1 mg  1 mg Intramuscular Q4H PRN Max 4/day    And   • benztropine (COGENTIN) injection 0 5 mg  0 5 mg Intramuscular Q4H PRN Max 4/day   • haloperidol lactate (HALDOL) injection 5 mg  5 mg Intramuscular Q4H PRN Max 4/day    And   • LORazepam (ATIVAN) injection 2 mg  2 mg Intramuscular Q4H PRN Max 4/day    And   • benztropine (COGENTIN) injection 1 mg  1 mg Intramuscular Q4H PRN Max 4/day   • benztropine (COGENTIN) injection 1 mg  1 mg Intramuscular BID PRN   • benztropine (COGENTIN) tablet 1 mg  1 mg Oral BID PRN   • hydrOXYzine HCL (ATARAX) tablet 50 mg  50 mg Oral Q6H PRN Max 4/day    Or   • diphenhydrAMINE (BENADRYL) injection 50 mg  50 mg Intramuscular E0Y PRN   • folic acid (FOLVITE) tablet 1 mg  1 mg Oral Daily   • glycerin-hypromellose- (ARTIFICIAL TEARS) ophthalmic solution 1 drop  1 drop Both Eyes Q3H PRN   • haloperidol (HALDOL) tablet 1 mg  1 mg Oral Q6H PRN   • haloperidol (HALDOL) tablet 2 5 mg  2 5 mg Oral Q4H PRN Max 4/day   • haloperidol (HALDOL) tablet 5 mg  5 mg Oral Q4H PRN Max 4/day   • hydrOXYzine HCL (ATARAX) tablet 100 mg  100 mg Oral Q6H PRN Max 4/day    Or   • LORazepam (ATIVAN) injection 2 mg  2 mg Intramuscular Q6H PRN   • hydrOXYzine HCL (ATARAX) tablet 25 mg  25 mg Oral Q6H PRN Max 4/day   • melatonin tablet 3 mg  3 mg Oral HS PRN   • mirtazapine (REMERON) tablet 15 mg  15 mg Oral HS   • multivitamin-minerals (CENTRUM) tablet 1 tablet  1 tablet Oral Daily   • nicotine (NICODERM CQ) 21 mg/24 hr TD 24 hr patch 21 mg  21 mg Transdermal Daily   • nicotine polacrilex (NICORETTE) gum 2 mg  2 mg Oral Q2H PRN   • polyethylene glycol (MIRALAX) packet 17 g  17 g Oral Daily PRN   • propranolol (INDERAL) tablet 10 mg  10 mg Oral Q8H PRN   • senna-docusate sodium (SENOKOT S) 8 6-50 mg per tablet 1 tablet  1 tablet Oral Daily PRN   • thiamine tablet 100 mg  100 mg Oral Daily   • traZODone (DESYREL) tablet 50 mg  50 mg Oral HS PRN       Behavioral Health Medications:   all current active meds have been reviewed  Vitals:  Vitals:    02/10/23 0748   BP: 100/68   Pulse: 80   Resp: 17   Temp: 97 5 °F (36 4 °C)   SpO2: 100%       Laboratory results:    I have personally reviewed all pertinent laboratory/tests results  Most Recent Labs:   Lab Results   Component Value Date    WBC 7 03 02/09/2023    RBC 4 58 02/09/2023    HGB 15 1 02/09/2023    HCT 44 5 02/09/2023     02/09/2023    RDW 12 2 02/09/2023    NEUTROABS 4 14 02/09/2023    SODIUM 136 02/09/2023    K 4 2 02/09/2023     02/09/2023    CO2 28 02/09/2023    BUN 12 02/09/2023    CREATININE 0 69 (L) 02/09/2023    GLUC 100 (H) 02/09/2023    GLUF 100 (H) 02/09/2023    CALCIUM 8 9 02/09/2023    AST 68 (H) 02/09/2023    ALT 73 (H) 02/09/2023    ALKPHOS 74 02/09/2023    TP 6 5 02/09/2023    ALB 3 6 02/09/2023    TBILI 0 61 02/09/2023    CHOLESTEROL 140 02/09/2023    HDL 83 02/09/2023    TRIG 36 02/09/2023    LDLCALC 50 02/09/2023    NONHDLC 57 02/09/2023    SIL1CGVXNQOH 4 270 02/09/2023    HGBA1C 5 6 06/27/2019     06/27/2019       Mental Status Evaluation:    Appearance:  casually dressed, disheveled, thin & gaunt looking and Hospital attire  Wincing over eye contact  adjustment   Behavior:  restless and fidgety and Improved interaction  Anxious but remains overall calm and cooperative and appreciative     Speech:  normal pitch, normal volume and Rather rapid at times but seems more anxious than pressured   Mood:  anxious and depressed   Affect:  constricted and Can brighten on approach   Thought Process:  circumstantial, goal directed and He does have difficulty expressing his thoughts and words at times   Thought Content:  Some negative ruminating thoughts  No overt delusions were verbalized   Perceptual Disturbances: None   Risk Potential: Suicidal Ideations none today  Homicidal Ideations none  Potential for Aggression only to self but low potential currently  Patient does not currently endorse thoughts to actually self-harm today such as hitting himself or hitting his head in the wall   Sensorium:  person, place and time/date   Memory:  recent and remote memory grossly intact, Although seems to be improving   Consciousness:  alert and awake    Attention: attention span appeared shorter than expected for age   Insight:  Improving   Judgment: limited   Gait/Station: normal gait/station and normal balance   Motor Activity: no abnormal movements       Progress Toward Goals: Progressing    Recommended Treatment: Continue with pharmacotherapy, group therapy, milieu therapy and occupational therapy  1   We will continue current medications and treatments for now including CIWA protocol  2   Collateral information as well as discharge planning  Risks, benefits and possible side effects of Medications:   Risks, benefits, and possible side effects of medications explained to patient and patient verbalizes understanding and agreement for treatment

## 2023-02-10 NOTE — PROGRESS NOTES
02/10/23 1222   Team Meeting   Meeting Type Daily Rounds   Team Members Present   Team Members Present Physician;Nurse;   Physician Team Member Yaima Ladd   Nursing Team Member [facility] Management Team Member [place]   Patient/Family Present   Patient Present No   Patient's Family Present No   Pt is pleasant and cooperative  Pt reports some depression and anxiety regarding his life stressors  Pt denies SI/HI, AVH  Med/meal compliant  DC tbd

## 2023-02-10 NOTE — PLAN OF CARE
Problem: Nutrition/Hydration-ADULT  Goal: Nutrient/Hydration intake appropriate for improving, restoring or maintaining nutritional needs  Description: Monitor and assess patient's nutrition/hydration status for malnutrition  Collaborate with interdisciplinary team and initiate plan and interventions as ordered  Monitor patient's weight and dietary intake as ordered or per policy  Utilize nutrition screening tool and intervene as necessary  Determine patient's food preferences and provide high-protein, high-caloric foods as appropriate       INTERVENTIONS:  - Monitor oral intake, urinary output, labs, and treatment plans  - Assess nutrition and hydration status and recommend course of action  - Evaluate amount of meals eaten  - Assist patient with eating if necessary   - Allow adequate time for meals  - Recommend/ encourage appropriate diets, oral nutritional supplements, and vitamin/mineral supplements  - Order, calculate, and assess calorie counts as needed  - Recommend, monitor, and adjust tube feedings and TPN/PPN based on assessed needs  - Assess need for intravenous fluids  - Provide specific nutrition/hydration education as appropriate  - Include patient/family/caregiver in decisions related to nutrition  Outcome: Progressing     Problem: Ineffective Coping  Goal: Cooperates with admission process  Description: Interventions:   - Complete admission process  Outcome: Progressing  Goal: Identifies ineffective coping skills  Outcome: Progressing  Goal: Identifies healthy coping skills  Outcome: Progressing  Goal: Demonstrates healthy coping skills  Outcome: Progressing  Goal: Participates in unit activities  Description: Interventions:  - Provide therapeutic environment   - Provide required programming   - Redirect inappropriate behaviors   Outcome: Progressing  Goal: Patient/Family participate in treatment and DC plans  Description: Interventions:  - Provide therapeutic environment  Outcome: Progressing Problem: Risk for Self Injury/Neglect  Goal: Treatment Goal: Remain safe during length of stay, learn and adopt new coping skills, and be free of self-injurious ideation, impulses and acts at the time of discharge  Outcome: Progressing  Goal: Verbalize thoughts and feelings  Description: Interventions:  - Assess and re-assess patient's lethality and potential for self-injury  - Engage patient in 1:1 interactions, daily, for a minimum of 15 minutes  - Encourage patient to express feelings, fears, frustrations, hopes  - Establish rapport/trust with patient   Outcome: Progressing  Goal: Refrain from harming self  Description: Interventions:  - Monitor patient closely, per order  - Develop a trusting relationship  - Supervise medication ingestion, monitor effects and side effects   Outcome: Progressing  Goal: Attend and participate in unit activities, including therapeutic, recreational, and educational groups  Description: Interventions:  - Provide therapeutic and educational activities daily, encourage attendance and participation, and document same in the medical record  - Obtain collateral information, encourage visitation and family involvement in care   Outcome: Progressing  Goal: Recognize maladaptive responses and adopt new coping mechanisms  Outcome: Progressing  Goal: Complete daily ADLs, including personal hygiene independently, as able  Description: Interventions:  - Observe, teach, and assist patient with ADLS  - Monitor and promote a balance of rest/activity, with adequate nutrition and elimination  Outcome: Progressing     Problem: Depression  Goal: Treatment Goal: Demonstrate behavioral control of depressive symptoms, verbalize feelings of improved mood/affect, and adopt new coping skills prior to discharge  Outcome: Progressing  Goal: Verbalize thoughts and feelings  Description: Interventions:  - Assess and re-assess patient's level of risk   - Engage patient in 1:1 interactions, daily, for a minimum of 15 minutes   - Encourage patient to express feelings, fears, frustrations, hopes   Outcome: Progressing  Goal: Refrain from harming self  Description: Interventions:  - Monitor patient closely, per order   - Supervise medication ingestion, monitor effects and side effects   Outcome: Progressing  Goal: Refrain from isolation  Description: Interventions:  - Develop a trusting relationship   - Encourage socialization   Outcome: Progressing  Goal: Refrain from self-neglect  Outcome: Progressing  Goal: Attend and participate in unit activities, including therapeutic, recreational, and educational groups  Description: Interventions:  - Provide therapeutic and educational activities daily, encourage attendance and participation, and document same in the medical record   Outcome: Progressing  Goal: Complete daily ADLs, including personal hygiene independently, as able  Description: Interventions:  - Observe, teach, and assist patient with ADLS  -  Monitor and promote a balance of rest/activity, with adequate nutrition and elimination   Outcome: Progressing     Problem: DISCHARGE PLANNING  Goal: Discharge to home or other facility with appropriate resources  Description: INTERVENTIONS:  - Identify barriers to discharge w/patient and caregiver  - Arrange for needed discharge resources and transportation as appropriate  - Identify discharge learning needs (meds, wound care, etc )  - Arrange for interpretive services to assist at discharge as needed  - Refer to Case Management Department for coordinating discharge planning if the patient needs post-hospital services based on physician/advanced practitioner order or complex needs related to functional status, cognitive ability, or social support system  Outcome: Progressing

## 2023-02-11 RX ORDER — LANOLIN ALCOHOL/MO/W.PET/CERES
3 CREAM (GRAM) TOPICAL
Status: DISCONTINUED | OUTPATIENT
Start: 2023-02-11 | End: 2023-02-20 | Stop reason: HOSPADM

## 2023-02-11 RX ADMIN — ACETAMINOPHEN 975 MG: 325 TABLET ORAL at 12:26

## 2023-02-11 RX ADMIN — MULTIPLE VITAMINS W/ MINERALS TAB 1 TABLET: TAB ORAL at 09:01

## 2023-02-11 RX ADMIN — NICOTINE 21 MG: 21 PATCH, EXTENDED RELEASE TRANSDERMAL at 09:01

## 2023-02-11 RX ADMIN — THIAMINE HCL TAB 100 MG 100 MG: 100 TAB at 09:01

## 2023-02-11 RX ADMIN — HYDROXYZINE HYDROCHLORIDE 25 MG: 25 TABLET ORAL at 12:25

## 2023-02-11 RX ADMIN — Medication 3 MG: at 21:01

## 2023-02-11 RX ADMIN — MIRTAZAPINE 15 MG: 15 TABLET, FILM COATED ORAL at 21:01

## 2023-02-11 RX ADMIN — FOLIC ACID 1 MG: 1 TABLET ORAL at 09:01
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
## 2023-02-11 NOTE — NURSING NOTE
Calm cooperative with staff  Denies SI/HI/AVH  Compliant with meds  Admits to some agitation after call to family however was able to cope without intervention   Continued q7m checks for safety

## 2023-02-11 NOTE — PLAN OF CARE
Problem: Nutrition/Hydration-ADULT  Goal: Nutrient/Hydration intake appropriate for improving, restoring or maintaining nutritional needs  Description: Monitor and assess patient's nutrition/hydration status for malnutrition  Collaborate with interdisciplinary team and initiate plan and interventions as ordered  Monitor patient's weight and dietary intake as ordered or per policy  Utilize nutrition screening tool and intervene as necessary  Determine patient's food preferences and provide high-protein, high-caloric foods as appropriate       INTERVENTIONS:  - Monitor oral intake, urinary output, labs, and treatment plans  - Assess nutrition and hydration status and recommend course of action  - Evaluate amount of meals eaten  - Assist patient with eating if necessary   - Allow adequate time for meals  - Recommend/ encourage appropriate diets, oral nutritional supplements, and vitamin/mineral supplements  - Order, calculate, and assess calorie counts as needed  - Recommend, monitor, and adjust tube feedings and TPN/PPN based on assessed needs  - Assess need for intravenous fluids  - Provide specific nutrition/hydration education as appropriate  - Include patient/family/caregiver in decisions related to nutrition  Outcome: Progressing     Problem: Ineffective Coping  Goal: Cooperates with admission process  Description: Interventions:   - Complete admission process  Outcome: Progressing  Goal: Identifies ineffective coping skills  Outcome: Progressing  Goal: Identifies healthy coping skills  Outcome: Progressing  Goal: Demonstrates healthy coping skills  Outcome: Progressing  Goal: Participates in unit activities  Description: Interventions:  - Provide therapeutic environment   - Provide required programming   - Redirect inappropriate behaviors   Outcome: Progressing  Goal: Patient/Family participate in treatment and DC plans  Description: Interventions:  - Provide therapeutic environment  Outcome: Progressing Problem: Risk for Self Injury/Neglect  Goal: Treatment Goal: Remain safe during length of stay, learn and adopt new coping skills, and be free of self-injurious ideation, impulses and acts at the time of discharge  Outcome: Progressing  Goal: Verbalize thoughts and feelings  Description: Interventions:  - Assess and re-assess patient's lethality and potential for self-injury  - Engage patient in 1:1 interactions, daily, for a minimum of 15 minutes  - Encourage patient to express feelings, fears, frustrations, hopes  - Establish rapport/trust with patient   Outcome: Progressing  Goal: Refrain from harming self  Description: Interventions:  - Monitor patient closely, per order  - Develop a trusting relationship  - Supervise medication ingestion, monitor effects and side effects   Outcome: Progressing  Goal: Attend and participate in unit activities, including therapeutic, recreational, and educational groups  Description: Interventions:  - Provide therapeutic and educational activities daily, encourage attendance and participation, and document same in the medical record  - Obtain collateral information, encourage visitation and family involvement in care   Outcome: Progressing  Goal: Recognize maladaptive responses and adopt new coping mechanisms  Outcome: Progressing  Goal: Complete daily ADLs, including personal hygiene independently, as able  Description: Interventions:  - Observe, teach, and assist patient with ADLS  - Monitor and promote a balance of rest/activity, with adequate nutrition and elimination  Outcome: Progressing     Problem: Depression  Goal: Treatment Goal: Demonstrate behavioral control of depressive symptoms, verbalize feelings of improved mood/affect, and adopt new coping skills prior to discharge  Outcome: Progressing  Goal: Verbalize thoughts and feelings  Description: Interventions:  - Assess and re-assess patient's level of risk   - Engage patient in 1:1 interactions, daily, for a minimum of 15 minutes   - Encourage patient to express feelings, fears, frustrations, hopes   Outcome: Progressing  Goal: Refrain from harming self  Description: Interventions:  - Monitor patient closely, per order   - Supervise medication ingestion, monitor effects and side effects   Outcome: Progressing  Goal: Refrain from isolation  Description: Interventions:  - Develop a trusting relationship   - Encourage socialization   Outcome: Progressing  Goal: Refrain from self-neglect  Outcome: Progressing  Goal: Attend and participate in unit activities, including therapeutic, recreational, and educational groups  Description: Interventions:  - Provide therapeutic and educational activities daily, encourage attendance and participation, and document same in the medical record   Outcome: Progressing  Goal: Complete daily ADLs, including personal hygiene independently, as able  Description: Interventions:  - Observe, teach, and assist patient with ADLS  -  Monitor and promote a balance of rest/activity, with adequate nutrition and elimination   Outcome: Progressing
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
## 2023-02-11 NOTE — NURSING NOTE
Pt reports increased anxiety after "talking about emotions " PRN Atarax 25mg PO administered at this time

## 2023-02-12 RX ADMIN — MIRTAZAPINE 22.5 MG: 7.5 TABLET, FILM COATED ORAL at 21:42

## 2023-02-12 RX ADMIN — NICOTINE 21 MG: 21 PATCH, EXTENDED RELEASE TRANSDERMAL at 08:19

## 2023-02-12 RX ADMIN — Medication 3 MG: at 21:43

## 2023-02-12 RX ADMIN — THIAMINE HCL TAB 100 MG 100 MG: 100 TAB at 08:19

## 2023-02-12 RX ADMIN — FOLIC ACID 1 MG: 1 TABLET ORAL at 08:19

## 2023-02-12 RX ADMIN — GLYCERIN, HYPROMELLOSE, POLYETHYLENE GLYCOL 1 DROP: .2; .2; 1 LIQUID OPHTHALMIC at 19:32

## 2023-02-12 RX ADMIN — MULTIPLE VITAMINS W/ MINERALS TAB 1 TABLET: TAB ORAL at 08:19

## 2023-02-12 NOTE — PROGRESS NOTES
RING Group Note     02/12/23 1115 02/12/23 1415   Activity/Group Checklist   Group [facility]  (Goals) Life Skills  (Teamwork and Communication)   Attendance Attended Attended   Attendance Duration (min) 16-30  (arrived late to group) 46-60  (left group for a brief period of time)   Interactions Interacted appropriately Interacted appropriately   Affect/Mood Appropriate;Bright Appropriate;Bright   Goals Achieved Identified feelings; Identified triggers; Discussed coping strategies; Discussed self-esteem issues; Able to listen to others; Able to engage in interactions; Able to reflect/comment on own behavior;Able to self-disclose; Able to recieve feedback; Able to give feedback to another  (developed appropriate goals) Identified feelings; Discussed coping strategies; Displayed empathy;Able to listen to others; Able to engage in interactions; Able to reflect/comment on own behavior;Able to self-disclose; Able to recieve feedback; Able to give feedback to another

## 2023-02-12 NOTE — NURSING NOTE
PT visible in the unit, interacting with other peers and staff  PT denies SI/HI/VH/AH, stated about his past struggling but feeling much better after starting opening up to staff and other family friends, he is feeling relieved and hopes this is a beginning of his healing process  Writer encouraged PT to do what works for him and staff are always available if need be for support  PT verbalized understanding and appears happy, relaxed the rest of this shift  HS meds and meal compliant

## 2023-02-12 NOTE — NURSING NOTE
Pt awake and alert, visible on the unit and social with select peers  Reports adequate sleep  Denies SI/HI/AVH, anxiety or depression at this time, states "I'm doing good so far today"  Pt compliant with scheduled medications and meals, appetite good  Requested contact lens rewetting drops  Encouraged to attend groups  Pt appropriate on unit with no behavioral issues to be noted  Denies any unmet needs  Q7 minute safety checks maintained

## 2023-02-12 NOTE — PROGRESS NOTES
Progress Note - Behavioral Health   Julissa Webster 43 y o  male MRN: [id number]  Unit/Bed#: U 350-02 Encounter: [id number]    Assessment/Plan   Principal Problem:    Current severe episode of major depressive disorder without psychotic features without prior episode ([facility] [address] )  Active Problems:    Medical clearance for psychiatric admission    Chronic hepatitis C without hepatic coma (HCC)    Alcohol use disorder, severe, dependence ([facility] [address] )    Severe protein-calorie malnutrition ([facility] [address] )      Recommended Treatment:   No psychopharmacologic changes necessary at this moment with the exception of increasing mirtazapine to 22 5 mg at bedtime for anxiety and depression; will continue to assess daily for further optimization  Patient last drink was 7 days ago and has had 0 CIWA for over 24hrs (with the exception of anxiety, which is more likely due to mental health), as such he can be safely taken off CIWA protocol  Continue with pharmacotherapy, group therapy, milieu therapy and occupational therapy  Continue to assess for adverse medication side effects  Encourage Julissa Webster to participate in nonverbal forms of therapy including journaling and art/music therapy  Continue frequent safety checks and vitals per unit protocol  Continue to engage CM/SW to assist with collateral, disposition planning, and the implementation of an individualized, patient-centered plan of care  Continue medical management by medical team   Case discussed with treatment team     Legal Status: 201  ------------------------------------------------------------    Subjective: All documentation including nursing notes, medication history to ensure medication adherence on the unit, labs, and vitals were reviewed  Keyla Taylor was evaluated this morning for continuity of care and no acute distress noted throughout the evaluation  Over the past 24 hours per nursing report, Keyla Taylor has been cooperative on the unit and compliant with medications        Today, Jorge Sears is consenting for safety on the unit  Jorge Sears reports feeling " pretty good " Jorge Sears notes having interrupted sleep  Jorge Sears states having a good appetite  Jorge Sears has been taking the medications as prescribed and reporting no side effects  Patient was seen and examined today at interview room  We discussed his losses and his destructive alcohol pattern  Discussed long term increase in dementia risk and need for optimal nutrition to support his body and brain health, as well as long term antidepressant medication  He is receptive to this  Jorge Sears denies suicidal ideations  Jorge Sears denies homicidal ideations  Regarding hallucinations, Jorge Sears denies    PRNs overnight: none   VS: Reviewed, within normal limits    Progress Toward Goals: slow improvement    Psychiatric Review of Systems:  Behavior over the last 24 hours:  improved  Sleep: slept off and on but improving  Appetite: normal  Medication side effects: No   ROS: all other systems are negative    Vital signs in last 24 hours:  Temp:  [97 4 °F (36 3 °C)-98 °F (36 7 °C)] 97 4 °F (36 3 °C)  HR:  [] 88  Resp:  [16] 16  BP: (111-143)/(60-80) 111/60    Laboratory results:  I have personally reviewed all pertinent laboratory/tests results  No results found for this or any previous visit (from the past 48 hour(s))        Mental Status Evaluation:    Appearance:  casually dressed, marginal hygiene, looks older than stated age, thin & gaunt looking   Behavior:  pleasant, cooperative   Speech:  normal rate and volume   Mood:  "pretty good"   Affect:  brighter, tearful   Thought Process:  organized, logical, coherent, goal directed, linear   Associations: intact associations   Thought Content:  no overt delusions   Perceptual Disturbances: Denies auditory or visual hallucinations and Does not appear to be responding to internal stimuli   Risk Potential: Suicidal ideation - None at present  Homicidal ideation - None at present  Potential for aggression - Not at present   Sensorium:  oriented to person, place and time/date   Memory:  recent and remote memory grossly intact   Consciousness:  alert and awake   Attention/Concentration: attention span and concentration are age appropriate   Insight:  improving   Judgment: improving   Gait/Station: normal gait/station   Motor Activity: no abnormal movements       Current Medications:  Current Facility-Administered Medications   Medication Dose Route Frequency Provider Last Rate   • acetaminophen  650 mg Oral Q6H PRN Christianson Bharat, DO     • acetaminophen  650 mg Oral Q4H PRN Christianson Bharat, DO     • acetaminophen  975 mg Oral Q6H PRN Christianson Bharat, DO     • aluminum-magnesium hydroxide-simethicone  30 mL Oral Q4H PRN Christianson Bharat, DO     • haloperidol lactate  2 5 mg Intramuscular Q4H PRN Max 4/day Christianson Bharat, DO      And   • LORazepam  1 mg Intramuscular Q4H PRN Max 4/day Christiansonjuan Nicholson, DO      And   • benztropine  0 5 mg Intramuscular Q4H PRN Max 4/day Christiansonjuan Nicholson, DO     • haloperidol lactate  5 mg Intramuscular Q4H PRN Max 4/day Christiansonjuan Nicholson, DO      And   • LORazepam  2 mg Intramuscular Q4H PRN Max 4/day Christianson Bharat, DO      And   • benztropine  1 mg Intramuscular Q4H PRN Max 4/day Christiansonjuan Nicholson, DO     • benztropine  1 mg Intramuscular BID PRN Christiansonjuan Nicholson, DO     • benztropine  1 mg Oral BID PRN Christiansonjuan Nicholson, DO     • hydrOXYzine HCL  50 mg Oral Q6H PRN Max 4/day Christiansonjuan Nicholson, DO      Or   • diphenhydrAMINE  50 mg Intramuscular Q6H PRN Christiansonjuan Nicholson, DO     • folic acid  1 mg Oral Daily Curtis Puls, DO     • glycerin-hypromellose-  1 drop Both Eyes Q3H PRN Christianson Bharat, DO     • haloperidol  1 mg Oral Q6H PRN Christianson Bharat, DO     • haloperidol  2 5 mg Oral Q4H PRN Max 4/day Christianson Bharat, DO     • haloperidol  5 mg Oral Q4H PRN Max 4/day Truett Gene Cloney, DO     • hydrOXYzine HCL  100 mg Oral Q6H PRN Max 4/day Truett Gene Cloney, DO      Or   • LORazepam  2 mg Intramuscular Q6H PRN Meghan Nicholson, DO     • hydrOXYzine HCL  25 mg Oral Q6H PRN Max 4/day Mayte Ochoa, DO     • melatonin  3 mg Oral HS PRN Meghan Nicholson, DO     • melatonin  3 mg Oral HS Michele Cordon MD     • mirtazapine  15 mg Oral HS Curtis Puls, DO     • multivitamin-minerals  1 tablet Oral Daily Curtis Puls, DO     • nicotine  21 mg Transdermal Daily Porter Camacho PA-C     • nicotine polacrilex  2 mg Oral Q2H PRN Meghan Nicholson, DO     • polyethylene glycol  17 g Oral Daily PRN Meghan Nicholson, DO     • propranolol  10 mg Oral Q8H PRN Meghan Nicholson, DO     • senna-docusate sodium  1 tablet Oral Daily PRN Meghan Nicholson, DO     • thiamine  100 mg Oral Daily Curtis Puls, DO     • traZODone  50 mg Oral HS PRN Curtis Puls, DO         Suicide/Homicide Risk Assessment:  Risk of Harm to Self:   Current Specific Risk Factors include: recent self abusive behavior, diagnosis of depression, alcohol use  Protective Factors: no current suicidal plan or intent, ability to communicate with staff on the unit, able to contract for safety on the unit, taking medications as ordered on the unit, ability to adapt to change, ability to make plans for the future, improved mood, strong relationships  Based on today's assessment, Fidencio Garcia presents the following risk of harm to self: minimal    Risk of Harm to Others:  Based on today's assessment, Fidencio Garcia presents the following risk of harm to others: minimal    The following interventions are recommended: behavioral checks every 7 minutes, continued hospitalization on locked unit    Behavioral Health Medications: All current active meds have been reviewed  Changes as in plan section above  Risks, benefits and possible side effects of Medications:   Risks, benefits, and possible side effects of medications explained to patient and patient verbalizes understanding        Counseling / Coordination of Care:  Patient's progress discussed with staff in treatment team meeting  Medications, treatment progress and treatment plan reviewed with patient  Mary PierceDO 02/12/23  Psychiatry Resident, PGY-II    This note was completed in part utilizing [employer] Direct Software  Grammatical, translation, syntax errors, random word insertions, spelling mistakes, and incomplete sentences may be an occasional consequence of this system secondary to software limitations with voice recognition, ambient noise, and hardware issues  If you have any questions or concerns about the content, text, or information contained within the body of this dictation, please contact the provider for clarification

## 2023-02-13 RX ADMIN — FOLIC ACID 1 MG: 1 TABLET ORAL at 08:15

## 2023-02-13 RX ADMIN — NICOTINE 21 MG: 21 PATCH, EXTENDED RELEASE TRANSDERMAL at 08:15

## 2023-02-13 RX ADMIN — MIRTAZAPINE 22.5 MG: 7.5 TABLET, FILM COATED ORAL at 21:25

## 2023-02-13 RX ADMIN — MULTIPLE VITAMINS W/ MINERALS TAB 1 TABLET: TAB ORAL at 08:15

## 2023-02-13 RX ADMIN — ACETAMINOPHEN 650 MG: 325 TABLET ORAL at 21:30

## 2023-02-13 RX ADMIN — Medication 3 MG: at 21:25

## 2023-02-13 RX ADMIN — THIAMINE HCL TAB 100 MG 100 MG: 100 TAB at 08:15

## 2023-02-13 NOTE — PROGRESS NOTES
02/13/23 1300   Activity/Group Checklist   Group Other (Comment)  (Recovery Group)   Attendance Attended   Attendance Duration (min) 31-45   Interactions Interacted appropriately   Affect/Mood Appropriate   Goals Achieved Identified feelings; Identified triggers; Able to listen to others; Able to engage in interactions; Able to reflect/comment on own behavior;Able to self-disclose     Participates actively in group  Left a little early because prefers more structured groups where people give one another more space to speak individually without perceived interruptions

## 2023-02-13 NOTE — PROGRESS NOTES
02/13/23 1000   Activity/Group Checklist   Group Other (Comment)  (Cognitive Distortions)   Attendance Attended   Attendance Duration (min) 46-60   Interactions Interacted appropriately   Affect/Mood Appropriate   Goals Achieved Identified feelings; Able to engage in interactions; Able to listen to others; Able to reflect/comment on own behavior;Able to self-disclose; Able to recieve feedback

## 2023-02-13 NOTE — PLAN OF CARE
Problem: Nutrition/Hydration-ADULT  Goal: Nutrient/Hydration intake appropriate for improving, restoring or maintaining nutritional needs  Description: Monitor and assess patient's nutrition/hydration status for malnutrition  Collaborate with interdisciplinary team and initiate plan and interventions as ordered  Monitor patient's weight and dietary intake as ordered or per policy  Utilize nutrition screening tool and intervene as necessary  Determine patient's food preferences and provide high-protein, high-caloric foods as appropriate       INTERVENTIONS:  - Monitor oral intake, urinary output, labs, and treatment plans  - Assess nutrition and hydration status and recommend course of action  - Evaluate amount of meals eaten  - Assist patient with eating if necessary   - Allow adequate time for meals  - Recommend/ encourage appropriate diets, oral nutritional supplements, and vitamin/mineral supplements  - Order, calculate, and assess calorie counts as needed  - Recommend, monitor, and adjust tube feedings and TPN/PPN based on assessed needs  - Assess need for intravenous fluids  - Provide specific nutrition/hydration education as appropriate  - Include patient/family/caregiver in decisions related to nutrition  2/13/2023 0728 by Richi Sharma RN  Outcome: Progressing  2/13/2023 0726 by Richi Sharma RN  Outcome: Progressing     Problem: Ineffective Coping  Goal: Cooperates with admission process  Description: Interventions:   - Complete admission process  2/13/2023 0728 by Richi Sharma RN  Outcome: Progressing  2/13/2023 0726 by Richi Sharma RN  Outcome: Progressing  Goal: Identifies ineffective coping skills  2/13/2023 0728 by Richi Sharma RN  Outcome: Progressing  2/13/2023 0726 by Richi Sharma RN  Outcome: Progressing  Goal: Identifies healthy coping skills  2/13/2023 0728 by Richi Sharma RN  Outcome: Progressing  2/13/2023 0726 by Richi Sharma RN  Outcome: Progressing  Goal: Demonstrates healthy coping skills  2/13/2023 0728 by Anival Sadler RN  Outcome: Progressing  2/13/2023 0726 by Anival Sadler RN  Outcome: Progressing  Goal: Participates in unit activities  Description: Interventions:  - Provide therapeutic environment   - Provide required programming   - Redirect inappropriate behaviors   2/13/2023 0728 by Anival Sadler RN  Outcome: Progressing  2/13/2023 0726 by Anival Sadler RN  Outcome: Progressing  Goal: Patient/Family participate in treatment and DC plans  Description: Interventions:  - Provide therapeutic environment  2/13/2023 0728 by Anival Sadler RN  Outcome: Progressing  2/13/2023 0726 by Anival Sadler RN  Outcome: Progressing     Problem: Risk for Self Injury/Neglect  Goal: Treatment Goal: Remain safe during length of stay, learn and adopt new coping skills, and be free of self-injurious ideation, impulses and acts at the time of discharge  2/13/2023 0728 by Anival Sadler RN  Outcome: Progressing  2/13/2023 0726 by Anival Sadler RN  Outcome: Progressing  Goal: Verbalize thoughts and feelings  Description: Interventions:  - Assess and re-assess patient's lethality and potential for self-injury  - Engage patient in 1:1 interactions, daily, for a minimum of 15 minutes  - Encourage patient to express feelings, fears, frustrations, hopes  - Establish rapport/trust with patient   2/13/2023 0728 by Anival Sadler RN  Outcome: Progressing  2/13/2023 0726 by Anival Sadler RN  Outcome: Progressing  Goal: Refrain from harming self  Description: Interventions:  - Monitor patient closely, per order  - Develop a trusting relationship  - Supervise medication ingestion, monitor effects and side effects   2/13/2023 0728 by Anival Sadler RN  Outcome: Progressing  2/13/2023 0726 by Anival Sadler RN  Outcome: Progressing  Goal: Attend and participate in unit activities, including therapeutic, recreational, and educational groups  Description: Interventions:  - Provide therapeutic and educational activities daily, encourage attendance and participation, and document same in the medical record  - Obtain collateral information, encourage visitation and family involvement in care   2/13/2023 0728 by Bola Lofton RN  Outcome: Progressing  2/13/2023 0726 by Bola Lofton RN  Outcome: Progressing  Goal: Recognize maladaptive responses and adopt new coping mechanisms  2/13/2023 0728 by Bola Lofton RN  Outcome: Progressing  2/13/2023 0726 by Bola Lofton RN  Outcome: Progressing  Goal: Complete daily ADLs, including personal hygiene independently, as able  Description: Interventions:  - Observe, teach, and assist patient with ADLS  - Monitor and promote a balance of rest/activity, with adequate nutrition and elimination  2/13/2023 0728 by Bola Lofton RN  Outcome: Progressing  2/13/2023 0726 by Bola Lofton RN  Outcome: Progressing     Problem: Depression  Goal: Treatment Goal: Demonstrate behavioral control of depressive symptoms, verbalize feelings of improved mood/affect, and adopt new coping skills prior to discharge  2/13/2023 0728 by Bola Lofton RN  Outcome: Progressing  2/13/2023 0726 by Bola Lofton RN  Outcome: Progressing  Goal: Verbalize thoughts and feelings  Description: Interventions:  - Assess and re-assess patient's level of risk   - Engage patient in 1:1 interactions, daily, for a minimum of 15 minutes   - Encourage patient to express feelings, fears, frustrations, hopes   2/13/2023 0728 by Bola Lofton RN  Outcome: Progressing  2/13/2023 0726 by Bola Lofton RN  Outcome: Progressing  Goal: Refrain from harming self  Description: Interventions:  - Monitor patient closely, per order   - Supervise medication ingestion, monitor effects and side effects   2/13/2023 0728 by Bola Lofton RN  Outcome: Progressing  2/13/2023 0726 by Bola Lofton RN  Outcome: Progressing  Goal: Refrain from isolation  Description: Interventions:  - Develop a trusting relationship   - Encourage socialization   2/13/2023 0728 by Rigo Babcock RN  Outcome: Progressing  2/13/2023 0726 by Rigo Babcock RN  Outcome: Progressing  Goal: Refrain from self-neglect  2/13/2023 0728 by Rigo Babcock RN  Outcome: Progressing  2/13/2023 0726 by Rigo Babcock RN  Outcome: Progressing  Goal: Attend and participate in unit activities, including therapeutic, recreational, and educational groups  Description: Interventions:  - Provide therapeutic and educational activities daily, encourage attendance and participation, and document same in the medical record   2/13/2023 0728 by Rigo Babcock RN  Outcome: Progressing  2/13/2023 0726 by Rigo Babcock RN  Outcome: Progressing  Goal: Complete daily ADLs, including personal hygiene independently, as able  Description: Interventions:  - Observe, teach, and assist patient with ADLS  -  Monitor and promote a balance of rest/activity, with adequate nutrition and elimination   2/13/2023 0728 by Rigo Babcock RN  Outcome: Progressing  2/13/2023 0726 by Rigo Babcock RN  Outcome: Progressing     Problem: DISCHARGE PLANNING  Goal: Discharge to home or other facility with appropriate resources  Description: INTERVENTIONS:  - Identify barriers to discharge w/patient and caregiver  - Arrange for needed discharge resources and transportation as appropriate  - Identify discharge learning needs (meds, wound care, etc )  - Arrange for interpretive services to assist at discharge as needed  - Refer to Case Management Department for coordinating discharge planning if the patient needs post-hospital services based on physician/advanced practitioner order or complex needs related to functional status, cognitive ability, or social support system  2/13/2023 0728 by Rigo Babcock RN  Outcome: Progressing  2/13/2023 0726 by Rigo Babcock RN  Outcome: Progressing

## 2023-02-13 NOTE — PROGRESS NOTES
02/13/23 0939   Activity/Group Checklist   Group Community meeting   Attendance Attended   Attendance Duration (min) 16-30   Interactions Interacted appropriately   Affect/Mood Appropriate   Goals Achieved Identified distorted thoughts/beliefs; Able to listen to others; Able to engage in interactions; Able to self-disclose     Malik Parkinson was thoughtful in group and supportive of others

## 2023-02-13 NOTE — PROGRESS NOTES
Met with Kimberly Farmer to discuss the possibility of him making the decision to go into inpatient dual treatment  He is not wiling at this time, stating he learned everything he needed and is willing to go to AA daily if needed  He wants to focus on mental health treatment  We discussed IOP dual treatment since he need a therapist who will be able to address the addictive family system, the shame and guilt without coddling him  He was in agreement  From our conversation he does know the 12 steps but it is unsure if he knows how to use them to cope with both his mental health and substance use  Patient knows this time around he is the one who is self motivated to get well  The last times he was court ordered  He also is contemplating being demoted to   We discussed that this is a blessing because of his codependency tendencies he loses sight to who he is and what his needs are  Therapist asked him to write a guilt list and begin to address the issues that drive him to drink, he was in agreement

## 2023-02-13 NOTE — NURSING NOTE
Flakita Nelson denies SI/HI/AH/VH  He is accepting of his medications  Pt reports difficulty sleeping d/t the bed  He reports eating well  Pt is irritable but cooperative with staff and peers  No behaviors to be reported  Will continue to monitor

## 2023-02-13 NOTE — NURSING NOTE
Patient is in large TV room watching Super Bowl with peers upon approach  Patient is calm and cooperative and medication compliant  Denies all psych symptoms at this time  Will continue to monitor for safety

## 2023-02-13 NOTE — PROGRESS NOTES
Progress Note - Behavioral Health   Ada Canchola 43 y o  male MRN: [id number]  Unit/Bed#: [facility] 350-02 Encounter: [id number]    Assessment/Plan   Principal Problem:    Current severe episode of major depressive disorder without psychotic features without prior episode ([facility] )  Active Problems:    Alcohol use disorder, severe, dependence ([facility] )    Chronic hepatitis C without hepatic coma ([facility] )    Medical clearance for psychiatric admission    Severe protein-calorie malnutrition ([facility] )      Subjective: Patient was seen, chart was reviewed, and case was discussed with entire team    Patient seen in room as taking a break from group  Patient was pleasant, friendly, remains overweight for all calm and cooperative  He is fidgety though and seems rather hyper  Patient is very positive about his experience here and what he has been able to get out of groups  Patient is happy he got to talk about past traumas over the weekend and feels much better about these things off of his chest   He had good conversations with family and friends outside the hospital on the phone  He has a positive future outlook including patient's substance use treatment meetings  He is happy his job is secure and his brother is very supportive  Patient reports he slept has been eating adequately patient reports he does not have thoughts to harm himself or kill himself  He did feel he was frustrated at some point over the weekend and wanted to bang his head but did not was able to control that which he is very happy about  He is medication compliant      Psychiatric Review of Systems:  Behavior over the last 24 hours: Improving  Sleep: improving  Appetite: adequate  Medication side effects: none verbalized  Medical ROS: Complete review of systems is negative except as noted above      Legal Status: 201          Vitals:  Vitals:    02/13/23 0752   BP: 117/61   Pulse: 89   Resp: 16   Temp: 97 7 °F (36 5 °C)   SpO2: 99%       Mental Status Exam:    Appearance:  alert, good eye contact, appears stated age, casually dressed, appropriate grooming and hygiene, poor dentition, thin, smiling and Hospital attire   Behavior:  calm, cooperative and Pleasant and friendly   Speech:  spontaneous, increased rate and coherent   Mood:  "Good "   Affect:  brighter, Not tearful at this time   Thought Process:  Organized, logical, goal-directed   Thought Content:  no verbalized delusions or overt paranoia   Perceptual Disturbances: no reported hallucinations and does not appear to be responding to internal stimuli at this time   Risk Potential: Suicidal ideation - None at present  Homicidal ideation - None at present  Potential for aggression - Decreased thoughts of self aggression  Sensorium:  oriented to person, place and time/date   Memory:  recent and remote memory grossly intact   Consciousness:  alert and awake   Attention/Concentration: attention span and concentration are age appropriate   Insight:  improving   Judgment: improving   Gait/Station: normal gait/station, normal balance   Motor Activity: no abnormal movements     Progress Toward Goals: Progressing    Recommended Treatment: Continue with pharmacotherapy, group therapy, milieu therapy and occupational therapy  Continue frequent safety checks and vitals per unit protocol  Continue with medical management as indicated  Continue coordinating with case management regarding disposition    1  We will continue current medications and treatments for now  As Remeron was just increased to 22 5 mg over the weekend  2   Collateral information and discharge planning        Risks, benefits and possible side effects of Medications: Risks, benefits, and possible side effects of medications have been explained to the patient, who verbalizes understanding      Current Medications:  Current Facility-Administered Medications   Medication Dose Route Frequency Provider Last Rate   • acetaminophen  650 mg Oral Q6H PRN Mitchell Cheryl, DO     • acetaminophen  650 mg Oral Q4H PRN Mitchell Cheryl, DO     • acetaminophen  975 mg Oral Q6H PRN Mitchell Cheryl, DO     • aluminum-magnesium hydroxide-simethicone  30 mL Oral Q4H PRN Mitchell Cheryl, DO     • haloperidol lactate  2 5 mg Intramuscular Q4H PRN Max 4/day Mitchell Cheryl, DO      And   • LORazepam  1 mg Intramuscular Q4H PRN Max 4/day Mitchell Cheryl, DO      And   • benztropine  0 5 mg Intramuscular Q4H PRN Max 4/day Mitchell Cheryl, DO     • haloperidol lactate  5 mg Intramuscular Q4H PRN Max 4/day Mitchell Cheryl, DO      And   • LORazepam  2 mg Intramuscular Q4H PRN Max 4/day Mitchell Cheryl, DO      And   • benztropine  1 mg Intramuscular Q4H PRN Max 4/day Mitchell Cheryl, DO     • benztropine  1 mg Intramuscular BID PRN Mitchell Cheryl, DO     • benztropine  1 mg Oral BID PRN Mitchell Cheryl, DO     • hydrOXYzine HCL  50 mg Oral Q6H PRN Max 4/day Mitchell Cheryl, DO      Or   • diphenhydrAMINE  50 mg Intramuscular Q6H PRN Mitchell Cheryl, DO     • folic acid  1 mg Oral Daily Yvone Elyssa, DO     • glycerin-hypromellose-  1 drop Both Eyes Q3H PRN Mtichell Cheryl, DO     • haloperidol  1 mg Oral Q6H PRN Mitchell Cheryl, DO     • haloperidol  2 5 mg Oral Q4H PRN Max 4/day Mitchell Cheryl, DO     • haloperidol  5 mg Oral Q4H PRN Max 4/day Equilla Malling Cloney, DO     • hydrOXYzine HCL  100 mg Oral Q6H PRN Max 4/day Equilla Malling Cloney, DO      Or   • LORazepam  2 mg Intramuscular Q6H PRN Mitchell Cheryl, DO     • hydrOXYzine HCL  25 mg Oral Q6H PRN Max 4/day Equilla Malling Cloney, DO     • melatonin  3 mg Oral HS PRN Mitchell Cheryl, DO     • melatonin  3 mg Oral HS Saroj Corea MD     • mirtazapine  22 5 mg Oral HS Freddy Foster, DO     • multivitamin-minerals  1 tablet Oral Daily Yvone Elsysa, DO     • nicotine  21 mg Transdermal Daily Leida Orellana PA-C     • nicotine polacrilex  2 mg Oral Q2H PRN Mitchell Luna, DO     • polyethylene glycol  17 g Oral Daily PRN Mitchell Luna, DO     • propranolol  10 mg Oral Q8H PRN Manny Arriaga DO     • senna-docusate sodium  1 tablet Oral Daily PRN Manny Arriaga DO     • thiamine  100 mg Oral Daily Elaine Jin DO     • traZODone  50 mg Oral HS PRN Elaine Jin DO         Behavioral Health Medications:   all current active meds have been reviewed  Laboratory results:  I have personally reviewed all pertinent laboratory/tests results  No results found for this or any previous visit (from the past 48 hour(s))           Elaine Jin DO 02/13/23

## 2023-02-13 NOTE — PROGRESS NOTES
[facility] Group Note     02/13/23 1100   Activity/Group Checklist   Group Life Skills  (Life Balance Wheel)   Attendance Attended   Attendance Duration (min) 46-60   Interactions Interacted appropriately   Affect/Mood Appropriate;Bright   Goals Achieved Identified feelings; Discussed coping strategies; Displayed empathy;Identified resources and support systems; Able to listen to others; Able to engage in interactions; Able to reflect/comment on own behavior;Able to self-disclose; Able to recieve feedback; Able to give feedback to another

## 2023-02-13 NOTE — PROGRESS NOTES
02/13/23 0827   Team Meeting   Meeting Type Daily Rounds   Team Members Present   Team Members Present Physician;Nurse;   Physician Team Member [facility]   Nursing Team Member [facility] Management Team Member Pam   Patient/Family Present   Patient Present No   Patient's Family Present No   Pt c/o anxiety on Saturday and received PRN Atarax  Pt reporting worsening depression on Saturday  Discussing trauma from 2015  Visible, social with peers  Med/meal compliant  CIWAs were d/c yesterday  Denies anxiety and depression on Sunday  Discharge to be determined

## 2023-02-13 NOTE — NURSING NOTE
Pt approached nurses station appearing agitated requesting to speak to a   When told that they are gone for the evening pt stated "I want to sign myself out, but not until I speak to a , so your'e telling me there is really nobody here to talk to?"  This writer informed pt that we can try to answer any questions he may have and we would let case management know he'd like to speak to them tomorrow morning  Pt walked away slamming his bedroom door  Now using headphones walking halls in behavior control

## 2023-02-13 NOTE — PLAN OF CARE
After Visit Summary   9/26/2018    Norman Mendiola    MRN: [id number]           Patient Information     Date Of Birth          2010        Visit Information        Provider Department      9/26/2018 8:30 AM Adi Muñoz MD; LANGUAGE [facility]        Today's Diagnoses     Acute streptococcal pharyngitis    -  1    Throat pain        Chronic seasonal allergic rhinitis due to pollen           Follow-ups after your visit        Your next 10 appointments already scheduled     Oct 23, 2018 10:15 AM CDT   Return Genetic with Karissa Atwood MD   Peds Genetics ([facility])    [facility]  [address]  [address] [facility] [address] [phone]              Who to contact     If you have questions or need follow up information about today's clinic visit or your schedule please contact [facility] directly at [phone].  Normal or non-critical lab and imaging results will be communicated to you by MyChart, letter or phone within 4 business days after the clinic has received the results. If you do not hear from us within 7 days, please contact the clinic through MyChart or phone. If you have a critical or abnormal lab result, we will notify you by phone as soon as possible.  Submit refill requests through GitHub or call your pharmacy and they will forward the refill request to us. Please allow 3 business days for your refill to be completed.          Additional Information About Your Visit        MyChart Information     GitHub lets you send messages to your doctor, view your test results, renew your prescriptions, schedule appointments and more. To sign up, go to [email or web address], contact your [place] clinic or call [phone] during business hours.            Care EveryWhere ID     This is your Care EveryWhere ID. This could be used by other organizations to access your [facility]  Problem: Nutrition/Hydration-ADULT  Goal: Nutrient/Hydration intake appropriate for improving, restoring or maintaining nutritional needs  Description: Monitor and assess patient's nutrition/hydration status for malnutrition  Collaborate with interdisciplinary team and initiate plan and interventions as ordered  Monitor patient's weight and dietary intake as ordered or per policy  Utilize nutrition screening tool and intervene as necessary  Determine patient's food preferences and provide high-protein, high-caloric foods as appropriate       INTERVENTIONS:  - Monitor oral intake, urinary output, labs, and treatment plans  - Assess nutrition and hydration status and recommend course of action  - Evaluate amount of meals eaten  - Assist patient with eating if necessary   - Allow adequate time for meals  - Recommend/ encourage appropriate diets, oral nutritional supplements, and vitamin/mineral supplements  - Order, calculate, and assess calorie counts as needed  - Recommend, monitor, and adjust tube feedings and TPN/PPN based on assessed needs  - Assess need for intravenous fluids  - Provide specific nutrition/hydration education as appropriate  - Include patient/family/caregiver in decisions related to nutrition  Outcome: Progressing     Problem: Ineffective Coping  Goal: Cooperates with admission process  Description: Interventions:   - Complete admission process  Outcome: Progressing  Goal: Identifies ineffective coping skills  Outcome: Progressing  Goal: Identifies healthy coping skills  Outcome: Progressing  Goal: Demonstrates healthy coping skills  Outcome: Progressing  Goal: Participates in unit activities  Description: Interventions:  - Provide therapeutic environment   - Provide required programming   - Redirect inappropriate behaviors   Outcome: Progressing  Goal: Patient/Family participate in treatment and DC plans  Description: Interventions:  - Provide therapeutic environment  Outcome: Progressing "records  [id number]        Your Vitals Were     Pulse Temperature Respirations Height Pulse Oximetry BMI (Body Mass Index)    105 100.3  F (37.9  C) (Oral) 24 4' 7.5\" (1.41 m) 100% 16.45 kg/m2       Blood Pressure from Last 3 Encounters:   09/26/18 121/75   05/03/18 114/64   03/06/18 111/74    Weight from Last 3 Encounters:   09/26/18 72 lb 1.5 oz (32.7 kg) (86 %)*   05/03/18 70 lb 12.8 oz (32.1 kg) (89 %)*   03/06/18 66 lb 12.8 oz (30.3 kg) (85 %)*     * Growth percentiles are based on [facility] 2-20 Years data.              We Performed the Following     Strep, Rapid Screen          Today's Medication Changes          These changes are accurate as of 9/26/18 11:25 AM.  If you have any questions, ask your nurse or doctor.               Start taking these medicines.        Dose/Directions    cefdinir 250 MG/5ML suspension   Commonly known as:  OMNICEF   Used for:  Throat pain   Started by:  Adi Muñoz MD        Dose:  4.5 mL   Take 4.5 mLs (225 mg) by mouth 2 times daily for 10 days   Quantity:  90 mL   Refills:  0         These medicines have changed or have updated prescriptions.        Dose/Directions    * ibuprofen 100 MG/5ML suspension   Commonly known as:  ADVIL/MOTRIN   This may have changed:  Another medication with the same name was added. Make sure you understand how and when to take each.   Changed by:  Adi Muñoz MD        Dose:  5 mg/kg   Take 5 mg/kg by mouth   Refills:  0       * ibuprofen 100 MG/5ML suspension   Commonly known as:  CHILDRENS MOTRIN   This may have changed:  You were already taking a medication with the same name, and this prescription was added. Make sure you understand how and when to take each.   Used for:  Throat pain   Changed by:  Adi Muñoz MD        Dose:  10 mg/kg   Take 15 mLs (300 mg) by mouth every 6 hours as needed for fever or moderate pain   Quantity:  237 mL   Refills:  1       * Notice:  This list has 2 medication(s) that are the same as other " Problem: Risk for Self Injury/Neglect  Goal: Treatment Goal: Remain safe during length of stay, learn and adopt new coping skills, and be free of self-injurious ideation, impulses and acts at the time of discharge  Outcome: Progressing  Goal: Verbalize thoughts and feelings  Description: Interventions:  - Assess and re-assess patient's lethality and potential for self-injury  - Engage patient in 1:1 interactions, daily, for a minimum of 15 minutes  - Encourage patient to express feelings, fears, frustrations, hopes  - Establish rapport/trust with patient   Outcome: Progressing  Goal: Refrain from harming self  Description: Interventions:  - Monitor patient closely, per order  - Develop a trusting relationship  - Supervise medication ingestion, monitor effects and side effects   Outcome: Progressing  Goal: Attend and participate in unit activities, including therapeutic, recreational, and educational groups  Description: Interventions:  - Provide therapeutic and educational activities daily, encourage attendance and participation, and document same in the medical record  - Obtain collateral information, encourage visitation and family involvement in care   Outcome: Progressing  Goal: Recognize maladaptive responses and adopt new coping mechanisms  Outcome: Progressing  Goal: Complete daily ADLs, including personal hygiene independently, as able  Description: Interventions:  - Observe, teach, and assist patient with ADLS  - Monitor and promote a balance of rest/activity, with adequate nutrition and elimination  Outcome: Progressing     Problem: Depression  Goal: Treatment Goal: Demonstrate behavioral control of depressive symptoms, verbalize feelings of improved mood/affect, and adopt new coping skills prior to discharge  Outcome: Progressing  Goal: Verbalize thoughts and feelings  Description: Interventions:  - Assess and re-assess patient's level of risk   - Engage patient in 1:1 interactions, daily, for a minimum medications prescribed for you. Read the directions carefully, and ask your doctor or other care provider to review them with you.         Where to get your medicines      These medications were sent to [facility] [place], [place] - [address]  [address] [facility] [address]     Phone:  [phone]     cefdinir 250 MG/5ML suspension    fluticasone 50 MCG/ACT spray    ibuprofen 100 MG/5ML suspension                Primary Care Provider Office Phone # Fax #    Adi Muñoz [phone]       [address]  [facility] [address]        Equal Access to Services     [facility]: Hadii aad ku hadasho Soomaali, waaxda luqadaha, qaybta kaalmada adeegyada, waxadelina gonzalez . So [facility] [phone].    ATENCIÓN: Si habla español, tiene a milan disposición servicios gratuitos de asistencia lingüística. [facility] [phone].    We comply with applicable federal civil rights laws and [place] laws. We do not discriminate on the basis of race, color, national origin, age, disability, sex, sexual orientation, or gender identity.            Thank you!     Thank you for choosing [facility]  for your care. Our goal is always to provide you with excellent care. Hearing back from our patients is one way we can continue to improve our services. Please take a few minutes to complete the written survey that you may receive in the mail after your visit with us. Thank you!             Your Updated Medication List - Protect others around you: Learn how to safely use, store and throw away your medicines at www.disposemymeds.org.          This list is accurate as of 9/26/18 11:25 AM.  Always use your most recent med list.                   Brand Name Dispense Instructions for use Diagnosis    acetaminophen 120 MG Suppository    TYLENOL     Place 210 mg rectally        cefdinir 250 MG/5ML suspension    OMNICEF    90 mL    Take  of 15 minutes   - Encourage patient to express feelings, fears, frustrations, hopes   Outcome: Progressing  Goal: Refrain from harming self  Description: Interventions:  - Monitor patient closely, per order   - Supervise medication ingestion, monitor effects and side effects   Outcome: Progressing  Goal: Refrain from isolation  Description: Interventions:  - Develop a trusting relationship   - Encourage socialization   Outcome: Progressing  Goal: Refrain from self-neglect  Outcome: Progressing  Goal: Attend and participate in unit activities, including therapeutic, recreational, and educational groups  Description: Interventions:  - Provide therapeutic and educational activities daily, encourage attendance and participation, and document same in the medical record   Outcome: Progressing  Goal: Complete daily ADLs, including personal hygiene independently, as able  Description: Interventions:  - Observe, teach, and assist patient with ADLS  -  Monitor and promote a balance of rest/activity, with adequate nutrition and elimination   Outcome: Progressing     Problem: DISCHARGE PLANNING  Goal: Discharge to home or other facility with appropriate resources  Description: INTERVENTIONS:  - Identify barriers to discharge w/patient and caregiver  - Arrange for needed discharge resources and transportation as appropriate  - Identify discharge learning needs (meds, wound care, etc )  - Arrange for interpretive services to assist at discharge as needed  - Refer to Case Management Department for coordinating discharge planning if the patient needs post-hospital services based on physician/advanced practitioner order or complex needs related to functional status, cognitive ability, or social support system  Outcome: Progressing 4.5 mLs (225 mg) by mouth 2 times daily for 10 days    Throat pain       * cholecalciferol 5000 units Caps capsule    vitamin D3    50 capsule    Take 2 tablets per week.    Vitamin D deficiency       * vitamin D 2000 units tablet     50 tablet    Take 1 tablet by mouth once a week    Encounter for routine child health examination w/o abnormal findings       fluticasone 50 MCG/ACT spray    FLONASE    1 Bottle    Spray 1-2 sprays into both nostrils daily    Chronic seasonal allergic rhinitis due to pollen       * ibuprofen 100 MG/5ML suspension    ADVIL/MOTRIN     Take 5 mg/kg by mouth        * ibuprofen 100 MG/5ML suspension    CHILDRENS MOTRIN    237 mL    Take 15 mLs (300 mg) by mouth every 6 hours as needed for fever or moderate pain    Throat pain       polyethylene glycol powder    MIRALAX    510 g    1/2 cap in 6 oz water/juice once a day    WCC (well child check)       * Notice:  This list has 4 medication(s) that are the same as other medications prescribed for you. Read the directions carefully, and ask your doctor or other care provider to review them with you.

## 2023-02-14 RX ADMIN — THIAMINE HCL TAB 100 MG 100 MG: 100 TAB at 08:52

## 2023-02-14 RX ADMIN — NICOTINE 21 MG: 21 PATCH, EXTENDED RELEASE TRANSDERMAL at 08:52

## 2023-02-14 RX ADMIN — MIRTAZAPINE 22.5 MG: 7.5 TABLET, FILM COATED ORAL at 21:23

## 2023-02-14 RX ADMIN — FOLIC ACID 1 MG: 1 TABLET ORAL at 08:52

## 2023-02-14 RX ADMIN — Medication 3 MG: at 21:23

## 2023-02-14 RX ADMIN — MULTIPLE VITAMINS W/ MINERALS TAB 1 TABLET: TAB ORAL at 08:52

## 2023-02-14 NOTE — PROGRESS NOTES
D: Met with Dasha Licea as follow up to our conversation yesterday  He identified that he was irritable and had high anxiety but used his coping skills refusing a PRN  He talked with his family, and refused their support since he wants to be responsible for his actions  He also noted now he needs to work more than before due to financial reasons brought on due to his addiction (Unamanageablilty)  He had cravings  Discussed with him to talk with his psychiatrist about Naltrexone for the use of cravings for a short period until he has a sober support system  We discussed how insidious addiction is and that this may help him be more successful along with dual IOP where he can see the psychiatrist  His sister in law is also supporting this  We also discussed he agreed to write his guilt list which he did not but plans on doing  Told him this writer will run a group on guilt giving him the opportunity to deal with some of his hidden emotions  A: Patient wants to get sober and be successful  He sometimes gets overwhelmed with the issues he is not dealing with  He has a difficult time accepting help from others and may have the tendency to experience codependency getting angry when others are not able to meet his needs  P: Therapist will continue to offer support and encouragement

## 2023-02-14 NOTE — PROGRESS NOTES
02/14/23 1000   Activity/Group Checklist   Group Anger management   Attendance Attended   Attendance Duration (min) 31-45   Interactions Interacted appropriately   Affect/Mood Appropriate   Goals Achieved Identified feelings; Able to listen to others

## 2023-02-14 NOTE — NURSING NOTE
Paul Menjivar denies SI/HI/AH/VH  He is accepting of his medications  Pt reports eating well but states poor sleep d/t the beds  He is visible and social with peers  No behaviors to be reported  Will continue to monitor

## 2023-02-14 NOTE — NURSING NOTE
Pt visible on unit, pressured in speech but pleasant with good eye contact  Pt states "I was really irritable today, I don't know why " Pt is medication and treatment adherent  Pt denies SI/HI/AH/VH, but endorses generalized soreness from "working out and these beds" pt given PRN tylenol, effective

## 2023-02-14 NOTE — PROGRESS NOTES
02/14/23 0848   Team Meeting   Meeting Type Daily Rounds   Team Members Present   Team Members Present Physician;Nurse;   Physician Team Member [facility] Team Member [facility] Management Team Member [place]   Patient/Family Present   Patient Present No   Patient's Family Present No   Pt irritable last night about dc  Pt pressured  Med/meal compliant  DC tbd

## 2023-02-14 NOTE — PROGRESS NOTES
02/14/23 1300   Activity/Group Checklist   Group   (recovery group)   Attendance Attended   Attendance Duration (min) 46-60   Interactions Interacted appropriately   Affect/Mood Appropriate   Goals Achieved Discussed coping strategies; Discussed self-esteem issues; Able to engage in interactions; Able to listen to others; Able to reflect/comment on own behavior;Able to self-disclose; Able to recieve feedback; Able to give feedback to another

## 2023-02-14 NOTE — PROGRESS NOTES
Progress Note - Behavioral Health   Erik Meyer 43 y o  male MRN: [id number]  Unit/Bed#: [facility] 350-02 Encounter: [id number]    Assessment/Plan   Principal Problem:    Current severe episode of major depressive disorder without psychotic features without prior episode ([facility] [address] )  Active Problems:    Medical clearance for psychiatric admission    Chronic hepatitis C without hepatic coma (HCC)    Alcohol use disorder, severe, dependence ([facility] [address] )    Severe protein[facility]calorie malnutrition [facility] )    Per staff report, patient has been irritable but cooperative with staff and peers  He was able to attend group and interact appropriately  Patient was able to tell staff that he was feeling irritable after a poor day yesterday  Today, patient reports doing much better  He notes some of his stressors included financial struggles which also made him feel suicidal yesterday  He was interested in naltrexone, and was also educated on possible gabapentin due to his symptoms of cravings, anxiety, and sleep  Because patient recently had Remeron adjusted, we will wait another day to see if patient needs further titration which she is agreeable to  We will continue on current regimen at this time, and recheck CMP for possible considerations of naltrexone  Recommended Treatment:   Melatonin 3 mg at bedtime for sleep  Remeron 22 5 mg at bedtime for sleep and mood  Continue remaining medications  CMP ordered for tomorrow    Continue with group therapy, milieu therapy and occupational therapy  Continue frequent safety checks and vitals per unit protocol    Case discussed with treatment team   Continue with SLIM medical management as indicated  Continue coordinating with case management regarding disposition  Risks, benefits and possible side effects of Medications: Risks, benefits, and possible side effects of medications have been explained to the patient, who verbalizes understanding    Legal Status: 201  ------------------------------------------------------------    Subjective: Per nursing report, Manuel Barcenas has been cooperative on the unit and compliant with medications  Today, patient reports feeling "good "  He did report that yesterday he was feeling "very anxious "  He notes that he was feeling "a little edgy "  He notes that his sleep has been "better than normal" and that he is able to receive more sleep, but did have to awaken 2 times during the night though had some more close to 8 hours of sleep  He does note that the "beds are not the greatest" which can attribute to some of his difficulties with sleep  He denies any issues with his appetite  He did not voice any concerns with his energy level  He notes he was demoted from work, but plans to return to working  He notes that yesterday when he was feeling somewhat anxious he was able to exercise to reduce his anxiety, as he preferred not to utilize medications if not needed  He also mentioned that his financial situation and having "no paycheck" discussing having a loan, which made him upset  He notes that he was able to address it  and it is "squared away "    Patient notes that yesterday he was having some suicidal ideations, but denies any today  He denies any homicidal ideation  He denies any auditory or visual hallucinations  PRNs overnight: Tylenol 650 mg  VS: Reviewed, within normal limits    Progress Toward Goals: slow improvement    Psychiatric Review of Systems:  Behavior over the last 24 hours: improved  Sleep: improving  Appetite: adequate  Medication side effects: none verbalized  ROS: Muscle pain from bed, otherwise Complete review of systems is negative except as noted above      Vital signs in last 24 hours:  Temp:  [97 9 °F (36 6 °C)] 97 9 °F (36 6 °C)  HR:  [90-94] 94  Resp:  [16] 16  BP: (127-139)/(61-80) 127/61    Mental Status Exam:  Appearance:  Blue longsleeved, sandals, blue jeans, glasses tucked in shirt Behavior:  Overall calm and cooperative, decent eye contact   Speech:  normal volume, normal pitch, spontaneous with minor increased rate, hyperverbal   Mood:  "Good"   Affect:  constricted, anxious   Thought Process:  logical, goal directed, linear   Associations: intact associations   Thought Content:  no overt delusions   Perceptual Disturbances: Denies auditory or visual hallucinations   Risk Potential: Suicidal ideation - None at present  Homicidal ideation - None at present  Potential for aggression - Not at present   Sensorium:  oriented to person, place and time/date   Memory:  recent and remote memory grossly intact   Consciousness:  alert and awake   Attention/Concentration: attention span and concentration are age appropriate   Insight:  limited   Judgment: limited    Gait/Station: normal gait/station, normal balance   Motor Activity: no abnormal movements     Current Medications:  Current Facility-Administered Medications   Medication Dose Route Frequency Provider Last Rate   • acetaminophen  650 mg Oral Q6H PRN Marijean Beth, DO     • acetaminophen  650 mg Oral Q4H PRN Marijean Beth, DO     • acetaminophen  975 mg Oral Q6H PRN Marijean Beth, DO     • aluminum-magnesium hydroxide-simethicone  30 mL Oral Q4H PRN Marijean Beth, DO     • haloperidol lactate  2 5 mg Intramuscular Q4H PRN Max 4/day Marijean Beth, DO      And   • LORazepam  1 mg Intramuscular Q4H PRN Max 4/day Marijean Beth, DO      And   • benztropine  0 5 mg Intramuscular Q4H PRN Max 4/day Marijean Beth, DO     • haloperidol lactate  5 mg Intramuscular Q4H PRN Max 4/day Marijean Beth, DO      And   • LORazepam  2 mg Intramuscular Q4H PRN Max 4/day Marijean Beth, DO      And   • benztropine  1 mg Intramuscular Q4H PRN Max 4/day Marijean Beth, DO     • benztropine  1 mg Intramuscular BID PRN Marijean Beth, DO     • benztropine  1 mg Oral BID PRN Marijean Beth, DO     • hydrOXYzine HCL  50 mg Oral Q6H PRN Max 4/day Marijean Beth, DO      Or   • diphenhydrAMINE  50 mg Intramuscular Q6H PRN Heidi Finder, DO     • folic acid  1 mg Oral Daily Meghana Dean, DO     • glycerin-hypromellose-  1 drop Both Eyes Q3H PRN Heidi Finder, DO     • haloperidol  1 mg Oral Q6H PRN Heidi Finder, DO     • haloperidol  2 5 mg Oral Q4H PRN Max 4/day Heidi Finder, DO     • haloperidol  5 mg Oral Q4H PRN Max 4/day Aleksey Kluver Cloney, DO     • hydrOXYzine HCL  100 mg Oral Q6H PRN Max 4/day Aleksey Kluver Cloney, DO      Or   • LORazepam  2 mg Intramuscular Q6H PRN Heidi Finder, DO     • hydrOXYzine HCL  25 mg Oral Q6H PRN Max 4/day Aleksey Kluver Cloney, DO     • melatonin  3 mg Oral HS PRN Heidi Finder, DO     • melatonin  3 mg Oral HS Colby Hutton MD     • mirtazapine  22 5 mg Oral HS Mary Pierce DO     • multivitamin-minerals  1 tablet Oral Daily Meghana Moran, DO     • nicotine  21 mg Transdermal Daily Wendi Joseph PA-C     • nicotine polacrilex  2 mg Oral Q2H PRN Heidi Finder, DO     • polyethylene glycol  17 g Oral Daily PRN Heidi Finder, DO     • propranolol  10 mg Oral Q8H PRN Heidi Finder, DO     • senna-docusate sodium  1 tablet Oral Daily PRN Heidi Finder, DO     • thiamine  100 mg Oral Daily Meghana Moran, DO     • traZODone  50 mg Oral HS PRN Meghana Moran, DO         Behavioral Health Medications: all current active meds have been reviewed  Changes as in plan section above  Laboratory results:  I have personally reviewed all pertinent laboratory/tests results  No results found for this or any previous visit (from the past 48 hour(s))  This note has been constructed using a voice recognition system  There may be translation, syntax, or grammatical errors  If you have any questions, please contact the dictating author      Narendra Yang DO

## 2023-02-15 LAB
ALBUMIN SERPL BCP-MCNC: 3.8 G/DL (ref 3.5–5)
ALP SERPL-CCNC: 97 U/L (ref 43–122)
ALT SERPL W P-5'-P-CCNC: 82 U/L
ANION GAP SERPL CALCULATED.3IONS-SCNC: 5 MMOL/L (ref 5–14)
AST SERPL W P-5'-P-CCNC: 51 U/L (ref 17–59)
BILIRUB SERPL-MCNC: 0.14 MG/DL (ref 0.2–1)
BUN SERPL-MCNC: 15 MG/DL (ref 5–25)
CALCIUM SERPL-MCNC: 9 MG/DL (ref 8.4–10.2)
CHLORIDE SERPL-SCNC: 107 MMOL/L (ref 96–108)
CO2 SERPL-SCNC: 27 MMOL/L (ref 21–32)
CREAT SERPL-MCNC: 0.71 MG/DL (ref 0.7–1.5)
GFR SERPL CREATININE-BSD FRML MDRD: 115 ML/MIN/1.73SQ M
GLUCOSE P FAST SERPL-MCNC: 106 MG/DL (ref 70–99)
GLUCOSE SERPL-MCNC: 106 MG/DL (ref 70–99)
POTASSIUM SERPL-SCNC: 4.6 MMOL/L (ref 3.5–5.3)
PROT SERPL-MCNC: 6.8 G/DL (ref 6.4–8.4)
SODIUM SERPL-SCNC: 139 MMOL/L (ref 135–147)

## 2023-02-15 RX ORDER — GABAPENTIN 100 MG/1
100 CAPSULE ORAL 3 TIMES DAILY
Status: DISCONTINUED | OUTPATIENT
Start: 2023-02-15 | End: 2023-02-16

## 2023-02-15 RX ADMIN — THIAMINE HCL TAB 100 MG 100 MG: 100 TAB at 08:31

## 2023-02-15 RX ADMIN — ACETAMINOPHEN 650 MG: 325 TABLET ORAL at 21:06

## 2023-02-15 RX ADMIN — GABAPENTIN 100 MG: 100 CAPSULE ORAL at 21:03

## 2023-02-15 RX ADMIN — Medication 3 MG: at 21:03

## 2023-02-15 RX ADMIN — MULTIPLE VITAMINS W/ MINERALS TAB 1 TABLET: TAB ORAL at 08:31

## 2023-02-15 RX ADMIN — NICOTINE 21 MG: 21 PATCH, EXTENDED RELEASE TRANSDERMAL at 08:31

## 2023-02-15 RX ADMIN — FOLIC ACID 1 MG: 1 TABLET ORAL at 08:31

## 2023-02-15 RX ADMIN — GABAPENTIN 100 MG: 100 CAPSULE ORAL at 16:27

## 2023-02-15 RX ADMIN — MIRTAZAPINE 22.5 MG: 7.5 TABLET, FILM COATED ORAL at 21:03

## 2023-02-15 NOTE — PROGRESS NOTES
Progress Note - Behavioral Health   Alicia Stinson 43 y o  male MRN: [id number]  Unit[facility]Bed#: [facility] Encounter: [id number]    Assessment/Plan   Principal Problem:    Current severe episode of major depressive disorder without psychotic features without prior episode ([facility] )  Active Problems:    Medical clearance for psychiatric admission    Chronic hepatitis C without hepatic coma ([facility] )    Alcohol use disorder, severe, dependence ([facility] )    Severe protein-calorie malnutrition ([facility] )    Recommended Treatment:   1  Begin gabapentin 100 mg TID for neuropathic pain and anxiety  2  Continue Remeron 22 5 mg QHS for mood/insomnia  3  Continue melatonin 3 mg QHS for insomnia  4  Add double protein to supplement meals  5  Labs remarkable for elevated fasting glucose (106), improved AST (51), slightly elevated ALT (82)  6  Continue remaining medications  Continue groups and milieu-therapy  Continue safety monitoring as per protocol  Case reviewed with treatment team   Continue with medication recommendations per SLIM   Continue disposition planning with CM    Risks, benefits and possible side effects of Medications: Risks, benefits, and possible side effects of medications have been explained to the patient, who verbalizes understanding    Legal Status: 201  ------------------------------------------------------------    Subjective: Per nursing report, Jaguar Dalton had no issues over night  He has reportedly been intrusive in other patients' care and often seen giving peers advice, and was redirected to focus on his needs  He is usually friendly with other patients and staff, but was irritable upon waking and stated "nobody woke me up for breakfast " He again became irritable and confrontational with nursing when his belongings were not immediately retrieved from downstairs, and shouted at staff while on the phone with sister-in-law, who had just dropped belongings off   He was not able to be redirected by staff but another patient convinced him to return to his room where he was able to calm himself  He has been compliant with medications and meals  Today, Netta Hassan is consenting for safety on the unit  He reports feeling "better, less edgy " Netta Hassan notes having improved sleep, getting at least 6-7 hours last night, although he rises often in the night to urinate which he reports has always been an issue  Netta Hassan states having an increased appetite and is requesting double portions, and is agreeable to having double proteins added for meals  Netta Hassan has been taking the medications as prescribed and reporting no side effects  He relayed the events described by nursing and admits that he "needed to calm down," which he was successfully able to do with the help of a peer encouraging him to go to his room to which he was able to do  He was able to use his coping skills to calm down without the use of PRN medications  Throughout the interview, he speaks rapidly and is at times difficult to interrupt  He states his energy level is good and improved with his improved sleep  He perseverates on how well he is recovering and repeated the story he told yesterday regarding a "cool nurse" who was "proud of me" for being able to "calm down without taking a pill " He repeated this story adamantly to this writer and attending physician during interview once again today  Following our discussion yesterday regarding whether to start naltrexone for alcohol cravings versus gabapentin for nerve pain and to decrease anxiety, patient states he would like to try gabapentin instead  Netta Hassan denies suicidal and homicidal ideations and denies any plan or intent to harm self or others  He  denies auditory or visual hallucinations      PRNs overnight: None taken  VS: Reviewed, within normal limits    Progress Toward Goals: Improvement    Psychiatric Review of Systems:  Behavior over the last 24 hours: Argumentative with staff, intrusive with other patients on unit  Sleep: improving, slept 6-7 hours last night, did report frustration with being woken for labs this morning  Appetite: improving, requesting and agreeable to double protein  Medication side effects: none verbalized  ROS: chronic pain which is unchanged from baseline, Complete review of systems is negative except as noted above  Vital signs in last 24 hours:  Temp:  [98 2 °F (36 8 °C)] 98 2 °F (36 8 °C)  HR:  [] 89  Resp:  [16] 16  BP: (123-150)/(77-82) 123/77    Mental Status Exam:  Appearance:  Tall, gaunt, overtly appearing  male, appears older than stated age, with adequate hygiene, wearing jeans, t-shirt, socks and slippers  Behavior:  Fair eye contact throughout interview  Cooperative, but restless and fidgety, some agitation when discussing frustrations with belongings    Speech:  normal volume, normal pitch, increased rate, at times difficult to interrupt, still able to be redirected   Mood:  "Better, less edgy"   Affect:  Less constricted, anxious at times, more reactive   Thought Process:  coherent, logical, linear   Associations: intact associations   Thought Content:  no overt delusions   Perceptual Disturbances: Denies auditory or visual hallucinations and Does not appear to be responding to internal stimuli   Risk Potential: Suicidal ideation - None at present   Contracts for safety, would speak to staff if feeling unsafe  Homicidal ideation - None at present  Potential for aggression - Not at present   Sensorium:  oriented to person, place and time/date   Memory:  recent and remote memory grossly intact   Consciousness:  alert and awake   Attention/Concentration: attention span and concentration are age appropriate   Insight:  Limited but improving   Judgment: Limited but improving   Gait/Station: normal gait/station   Motor Activity: Some psychomotor agitation in interview today, restless and fidgety today      Current Medications:  Current Facility-Administered Medications Medication Dose Route Frequency Provider Last Rate   • acetaminophen  650 mg Oral Q6H PRN Norlin Sinks, DO     • acetaminophen  650 mg Oral Q4H PRN Norlin Sinks, DO     • acetaminophen  975 mg Oral Q6H PRN Norlin Sinks, DO     • aluminum-magnesium hydroxide-simethicone  30 mL Oral Q4H PRN Norlin Sinks, DO     • haloperidol lactate  2 5 mg Intramuscular Q4H PRN Max 4/day Norlin Sinks, DO      And   • LORazepam  1 mg Intramuscular Q4H PRN Max 4/day Norlin Sinks, DO      And   • benztropine  0 5 mg Intramuscular Q4H PRN Max 4/day Norlin Sinks, DO     • haloperidol lactate  5 mg Intramuscular Q4H PRN Max 4/day Norlin Sinks, DO      And   • LORazepam  2 mg Intramuscular Q4H PRN Max 4/day Norlin Sinks, DO      And   • benztropine  1 mg Intramuscular Q4H PRN Max 4/day Norlin Sinks, DO     • benztropine  1 mg Intramuscular BID PRN Norlin Sinks, DO     • benztropine  1 mg Oral BID PRN Norlin Sinks, DO     • hydrOXYzine HCL  50 mg Oral Q6H PRN Max 4/day Norlin Sinks, DO      Or   • diphenhydrAMINE  50 mg Intramuscular Q6H PRN Norlin Sinks, DO     • folic acid  1 mg Oral Daily Mirna Mcclure, DO     • gabapentin  100 mg Oral TID Ivelisse Rothman, DO     • glycerin-hypromellose-  1 drop Both Eyes Q3H PRN Norlin Sinks, DO     • haloperidol  1 mg Oral Q6H PRN Norlin Sinks, DO     • haloperidol  2 5 mg Oral Q4H PRN Max 4/day Norlin Sinks, DO     • haloperidol  5 mg Oral Q4H PRN Max 4/day Arcenio Comes Cloney, DO     • hydrOXYzine HCL  100 mg Oral Q6H PRN Max 4/day Arcenio Comes Cloney, DO      Or   • LORazepam  2 mg Intramuscular Q6H PRN Norlin Sinks, DO     • hydrOXYzine HCL  25 mg Oral Q6H PRN Max 4/day Arcenio Comes Cloney, DO     • melatonin  3 mg Oral HS PRN Norlin Sinks, DO     • melatonin  3 mg Oral HS Deandra Huffman MD     • mirtazapine  22 5 mg Oral HS Ziggy Olmos DO     • multivitamin-minerals  1 tablet Oral Daily Mirna Mcclure DO     • nicotine  21 mg Transdermal Daily Collette JANE Héctor Gan PA-C     • nicotine polacrilex  2 mg Oral Q2H PRN Elizabeth Abu, DO     • polyethylene glycol  17 g Oral Daily PRN Elizabeth Abu, DO     • propranolol  10 mg Oral Q8H PRN Elizabeth Abu, DO     • senna-docusate sodium  1 tablet Oral Daily PRN Elizabeth Abu, DO     • thiamine  100 mg Oral Daily Rebecca Bloomington, DO     • traZODone  50 mg Oral HS PRN Rebecca Bloomington, DO         Behavioral Health Medications: all current active meds have been reviewed  Changes as in plan section above  Laboratory results:  I have personally reviewed all pertinent laboratory/tests results    Recent Results (from the past 48 hour(s))   Comprehensive metabolic panel    Collection Time: 02/15/23  6:17 AM   Result Value Ref Range    Sodium 139 135 - 147 mmol/L    Potassium 4 6 3 5 - 5 3 mmol/L    Chloride 107 96 - 108 mmol/L    CO2 27 21 - 32 mmol/L    ANION GAP 5 5 - 14 mmol/L    BUN 15 5 - 25 mg/dL    Creatinine 0 71 0 70 - 1 50 mg/dL    Glucose 106 (H) 70 - 99 mg/dL    Glucose, Fasting 106 (H) 70 - 99 mg/dL    Calcium 9 0 8 4 - 10 2 mg/dL    AST 51 17 - 59 U/L    ALT 82 (H) <50 U/L    Alkaline Phosphatase 97 43 - 122 U/L    Total Protein 6 8 6 4 - 8 4 g/dL    Albumin 3 8 3 5 - 5 0 g/dL    Total Bilirubin 0 14 (L) 0 20 - 1 00 mg/dL    eGFR 115 ml/min/1 73sq m          [facility]

## 2023-02-15 NOTE — NURSING NOTE
Pt visible and social on unit  Denies SI/HI and hallucinations  No complaints or concerns offered by pt  Pt is intrusive in other pt's care and is often seen giving peers advice, directed to focus on his needs  Pt compliant with medications and unit routines  Safety checks continue

## 2023-02-15 NOTE — PROGRESS NOTES
02/15/23 0826   Team Meeting   Meeting Type Daily Rounds   Team Members Present   Team Members Present Physician;Nurse;   Physician Team Member [facility] Pam   Patient/Family Present   Patient Present No   Patient's Family Present No   Pt med/meal compliant  Redirected to focus on his own treatment d/t being overly involved with other peers and giving advice  Discharge to be determined

## 2023-02-15 NOTE — PROGRESS NOTES
RING Group Note     02/15/23 1400   Activity/Group Checklist   Group Life Skills  (Depression Board Game)   Attendance Attended   Attendance Duration (min) Greater than 60   Interactions Interacted appropriately   Affect/Mood Appropriate;Bright   Goals Achieved Identified feelings; Identified triggers; Discussed coping strategies; Able to listen to others; Able to engage in interactions; Able to reflect/comment on own behavior;Able to self-disclose; Able to recieve feedback; Able to give feedback to another

## 2023-02-15 NOTE — NURSING NOTE
Melani Newton denies SI/HI/AH/VH  Pt irritable and labile immediately upon awakening  Pt c/o blood work collected early in morning, "nobody woke me up for breakfast", and belongings his sister brought in today  Pt demanding staff go down immediately to get his belongings "because they lost my stuff the last time"  Pt shouting at staff while on the phone  Unable to be verbally redirected but pt able to calm self and went to his room  He was accepting of his medications  Will continue to monitor

## 2023-02-15 NOTE — PROGRESS NOTES
RING Group Note     02/14/23 1100 02/14/23 1415   Activity/Group Checklist   Group Life Skills  (Understanding the Ripple Effect) Personal control  (Music and Lyrics)   Attendance Attended Attended   Attendance Duration (min) 46-60  (pulled by clinician) Greater than 60   Interactions Interacted appropriately Interacted appropriately   Affect/Mood Appropriate;Calm Appropriate;Bright   Goals Achieved Identified feelings; Identified triggers; Discussed coping strategies; Able to listen to others; Able to engage in interactions; Able to reflect/comment on own behavior;Able to self-disclose; Able to recieve feedback; Able to give feedback to another Identified feelings; Identified triggers; Discussed coping strategies; Able to listen to others; Able to engage in interactions; Able to reflect/comment on own behavior;Able to self-disclose; Able to recieve feedback; Able to give feedback to another

## 2023-02-15 NOTE — PROGRESS NOTES
02/15/23 9542   Activity/Group Checklist   Group Community meeting   Attendance Attended   Attendance Duration (min) 16-30   Interactions Interacted appropriately   Affect/Mood Appropriate   Goals Achieved Able to listen to others; Able to engage in interactions; Able to reflect/comment on own behavior;Able to self-disclose; Able to recieve feedback

## 2023-02-15 NOTE — PROGRESS NOTES
02/15/23 1000   Activity/Group Checklist   Group Other (Comment)  (Group Art Therapy/Psychodynamic, Open Choice with Discussion)   Attendance Attended   Attendance Duration (min) Greater than 60   Interactions Interacted appropriately   Affect/Mood Appropriate   Goals Achieved Discussed coping strategies; Increased hopefulness; Able to listen to others; Able to engage in interactions; Able to reflect/comment on own behavior;Able to recieve feedback; Able to give feedback to another  (Able to engage materials; full participation with discussion)

## 2023-02-16 RX ORDER — GABAPENTIN 100 MG/1
200 CAPSULE ORAL 3 TIMES DAILY
Status: DISCONTINUED | OUTPATIENT
Start: 2023-02-16 | End: 2023-02-20 | Stop reason: HOSPADM

## 2023-02-16 RX ADMIN — GABAPENTIN 200 MG: 100 CAPSULE ORAL at 21:18

## 2023-02-16 RX ADMIN — MULTIPLE VITAMINS W/ MINERALS TAB 1 TABLET: TAB ORAL at 08:22

## 2023-02-16 RX ADMIN — THIAMINE HCL TAB 100 MG 100 MG: 100 TAB at 08:22

## 2023-02-16 RX ADMIN — Medication 3 MG: at 21:18

## 2023-02-16 RX ADMIN — GLYCERIN, HYPROMELLOSE, POLYETHYLENE GLYCOL 1 DROP: .2; .2; 1 LIQUID OPHTHALMIC at 08:24

## 2023-02-16 RX ADMIN — GABAPENTIN 200 MG: 100 CAPSULE ORAL at 15:26

## 2023-02-16 RX ADMIN — MIRTAZAPINE 22.5 MG: 7.5 TABLET, FILM COATED ORAL at 21:18

## 2023-02-16 RX ADMIN — FOLIC ACID 1 MG: 1 TABLET ORAL at 08:22

## 2023-02-16 RX ADMIN — GABAPENTIN 100 MG: 100 CAPSULE ORAL at 08:22

## 2023-02-16 RX ADMIN — NICOTINE 21 MG: 21 PATCH, EXTENDED RELEASE TRANSDERMAL at 08:22

## 2023-02-16 NOTE — PROGRESS NOTES
Progress Note - Behavioral Health   Atif Ball 43 y o  male MRN: [id number]  Unit/Bed#: [facility] 350-02 Encounter: [id number]    Assessment/Plan   Principal Problem:    Current severe episode of major depressive disorder without psychotic features without prior episode ([facility] )  Active Problems:    Medical clearance for psychiatric admission    Chronic hepatitis C without hepatic coma ([facility] )    Alcohol use disorder, severe, dependence ([facility] )    Severe protein-calorie malnutrition ([facility] )      Recommended Treatment:   1  Increase gabapentin to 200 mg TID  If necessary and tolerable, consider increase to 300 mg TID in future for nerve pain and anxiety  2  Continue Remeron 22 5 mg QHS for depression and insomnia  3  Continue melatonin 3mg QHS for insomnia    Continue to attend group therapy, milieu therapy and occupational therapy  Continue q15 safety checks and vitals per unit protocol  Case discussed with treatment team   Continue with SLIM medical management as needed  Continue coordinating with CM regarding disposition  Risks, benefits and possible side effects of Medications: Risks, benefits, and possible side effects of medications have been explained to the patient, who verbalizes understanding    Legal Status: 201  ------------------------------------------------------------    Subjective: Overnight per nursing, Eri Hess has been cooperative on the unit but can be easily agitated and requires redirection  He is able to calm himself using coping strategies appropriately  Today, Eri Hess is consenting for safety on the unit  He reports feeling "pretty good " Eri Hess notes having the "best night's sleep" he's had since admission  He estimates he got about 8 hours last night, waking a few times to urinate  He reports feeling "refreshed" this morning  Eri Hess states that his appetite is good, and has felt sated with the addition of double protein to his meals   Eri Hess has been taking the medications as prescribed, and reported some increased drowsiness following initiation of gabapentin, which patient reports is improving  Attending physician remarked that he appears less restless, and Ally Salvador agrees that he feels a difference with the gabapentin  He reports feeling energetic, but "not too much " He inquired about when he would be leaving and we discussed a medication adjustment, an increase in the gabapentin, with a possible discharge early next week  Ally Salvador is agreeable to this change  He states he feels like these medications will help him feel the need to self-medicate with alcohol, and he is looking forward to returning to work  lAly Salvador denies suicidal ideations and denies having a plan  Ally Salvador denies homicidal ideations  Regarding hallucinations, Ally Salvador denies auditory and visual hallucinations  PRNs overnight: Tylenol 650 mg for back and shoulder pain/stiffness  VS: Reviewed, within normal limits    Progress Toward Goals: improvement    Psychiatric Review of Systems:  Behavior over the last 24 hours: improved, patient appears more focused on his own progress  Sleep: improving, slept approximately 8 hours last night with 1-2 awakenings to urinate, after which he was able to fall asleep again easily  Appetite: improving  Medication side effects: as noted above  ROS: chronic pain which is unchanged from baseline  Complete ROS negative except as noted  Vital signs in last 24 hours:  Temp:  [97 8 °F (36 6 °C)-98 1 °F (36 7 °C)] 97 8 °F (36 6 °C)  HR:  [] 104  Resp:  [16] 16  BP: (107-132)/(67-76) 107/67    Mental Status Exam:  Appearance:  Tall, thin, overtly-appearing  male, appears older than stated age, with adequate hygiene, wearing sweat pants, t-shirt, socks and slippers  Behavior:  Cooperative, less fidgety and restess compared to yesterday's encounter  Maintained appropriate eye contact throughout the interview  No PMA or slowing   Speech:  normal volume and pitch   Increased rate at times, but interruptible, talkative    Mood:  "pretty good"   Affect:  Less constricted, slightly anxious at times, more reactive today   Thought Process:  logical, coherent, linear   Associations: intact associations   Thought Content:  no overt delusions   Perceptual Disturbances: Denies auditory or visual hallucinations and does not appear to be responding to internal stimuli   Risk Potential: Suicidal ideation - None, contracts for safety on the unit, would speak to staff if not feeling safe  Homicidal ideation - None  Potential for aggression - Not at present   Sensorium:  oriented to person, place and time/date   Memory:  recent and remote memory grossly intact   Consciousness:  alert and awake   Attention/Concentration: attention span and concentration are age appropriate   Insight:  Limited but continue to improve   Judgment: Limited but continue to improve   Gait/Station: normal gait/station   Motor Activity: Decreased psychomotor agitation, less restless and fidgety       Current Medications:  Current Facility-Administered Medications   Medication Dose Route Frequency Provider Last Rate   • acetaminophen  650 mg Oral Q6H PRN Coralyn Panna Maria, DO     • acetaminophen  650 mg Oral Q4H PRN Coralyn Panna Maria, DO     • acetaminophen  975 mg Oral Q6H PRN Coralyn Panna Maria, DO     • aluminum-magnesium hydroxide-simethicone  30 mL Oral Q4H PRN Coralyn Panna Maria, DO     • haloperidol lactate  2 5 mg Intramuscular Q4H PRN Max 4/day Coralyn Panna Maria, DO      And   • LORazepam  1 mg Intramuscular Q4H PRN Max 4/day Coralyn Panna Maria, DO      And   • benztropine  0 5 mg Intramuscular Q4H PRN Max 4/day Coralyn Panna Maria, DO     • haloperidol lactate  5 mg Intramuscular Q4H PRN Max 4/day Coralyn Panna Maria, DO      And   • LORazepam  2 mg Intramuscular Q4H PRN Max 4/day Coralyn Panna Maria, DO      And   • benztropine  1 mg Intramuscular Q4H PRN Max 4/day Coralyn Panna Maria, DO     • benztropine  1 mg Intramuscular BID PRN Coralyn Panna Maria, DO     • benztropine  1 mg Oral BID PRN Meghan Nicholson, DO     • hydrOXYzine HCL  50 mg Oral Q6H PRN Max 4/day Meghan Nicholson, DO      Or   • diphenhydrAMINE  50 mg Intramuscular Q6H PRN Meghan Nicholson, DO     • folic acid  1 mg Oral Daily Curtis Puls, DO     • gabapentin  200 mg Oral TID Eileen Medina, DO     • glycerin-hypromellose-  1 drop Both Eyes Q3H PRN Meghan Nicholson, DO     • haloperidol  1 mg Oral Q6H PRN Meghan Nicholson, DO     • haloperidol  2 5 mg Oral Q4H PRN Max 4/day Meghan Nicholson, DO     • haloperidol  5 mg Oral Q4H PRN Max 4/day Truett Gene Cloney, DO     • hydrOXYzine HCL  100 mg Oral Q6H PRN Max 4/day Truett Gene Cloney, DO      Or   • LORazepam  2 mg Intramuscular Q6H PRN Meghan Nicholson, DO     • hydrOXYzine HCL  25 mg Oral Q6H PRN Max 4/day Truett Gene Cloney, DO     • melatonin  3 mg Oral HS PRN Meghan Nicholson, DO     • melatonin  3 mg Oral HS Michele Cordon MD     • mirtazapine  22 5 mg Oral HS Marco Antonio Holloway, DO     • multivitamin-minerals  1 tablet Oral Daily Curtis Puls, DO     • nicotine  21 mg Transdermal Daily Porter Camacho PA-C     • nicotine polacrilex  2 mg Oral Q2H PRN Meghan Nicholson, DO     • polyethylene glycol  17 g Oral Daily PRN Meghan Nicholson, DO     • propranolol  10 mg Oral Q8H PRN Meghan Nicholson, DO     • senna-docusate sodium  1 tablet Oral Daily PRN Meghan Nicholson, DO     • thiamine  100 mg Oral Daily Curtis Puls, DO     • traZODone  50 mg Oral HS PRN Curtis Puls, DO         Behavioral Health Medications: all current active meds have been reviewed  Changes as in plan section above  Laboratory results:  I have personally reviewed all pertinent laboratory/tests results    Recent Results (from the past 48 hour(s))   Comprehensive metabolic panel    Collection Time: 02/15/23  6:17 AM   Result Value Ref Range    Sodium 139 135 - 147 mmol/L    Potassium 4 6 3 5 - 5 3 mmol/L    Chloride 107 96 - 108 mmol/L    CO2 27 21 - 32 mmol/L    ANION GAP 5 5 - 14 mmol/L    BUN 15 5 - 25 mg/dL    Creatinine 0 71 0 70 - 1 50 mg/dL    Glucose 106 (H) 70 - 99 mg/dL    Glucose, Fasting 106 (H) 70 - 99 mg/dL    Calcium 9 0 8 4 - 10 2 mg/dL    AST 51 17 - 59 U/L    ALT 82 (H) <50 U/L    Alkaline Phosphatase 97 43 - 122 U/L    Total Protein 6 8 6 4 - 8 4 g/dL    Albumin 3 8 3 5 - 5 0 g/dL    Total Bilirubin 0 14 (L) 0 20 - 1 00 mg/dL    eGFR 115 ml/min/1 73sq m          [facility]

## 2023-02-16 NOTE — PROGRESS NOTES
02/16/23 1300   Activity/Group Checklist   Group   (recocewry group)   Attendance Attended   Attendance Duration (min) 31-45   Interactions Interacted appropriately   Affect/Mood Appropriate   Goals Achieved Discussed self-esteem issues; Able to listen to others; Able to engage in interactions; Able to reflect/comment on own behavior;Able to self-disclose; Able to recieve feedback; Able to give feedback to another

## 2023-02-16 NOTE — PROGRESS NOTES
02/16/23 0853   Team Meeting   Meeting Type Daily Rounds   Team Members Present   Team Members Present Physician;Nurse;   Physician Team Member [facility] Team Member [facility] Management Team Member [place]   Patient/Family Present   Patient Present No   Patient's Family Present No   Pt agitated yesterday, remains with anxiety     Pt started on gabapentin  Gabapentin and remeron to be titrated  Med/meal compliant  Dc possible Monday

## 2023-02-16 NOTE — NURSING NOTE
Pt calm and cooperative on shift  Can be easily agitated and needs redirecting  Maintains behavioral control on shift  Compliant with meds  Denies SI/HI/AVH   continued q7m checks for safety

## 2023-02-16 NOTE — PROGRESS NOTES
02/16/23 0930   Activity/Group Checklist   Group Community meeting   Attendance Attended   Attendance Duration (min) 16-30   Interactions Interacted appropriately   Affect/Mood Appropriate   Goals Achieved Able to listen to others; Able to engage in interactions; Able to self-disclose; Able to recieve feedback

## 2023-02-16 NOTE — NURSING NOTE
The pt was awake and in his rm for assessment and med past  The pt denied pain, depression,anxiety, SI/HI, A/V hallucinations  The pt said he felt much better and is looking forward to D/C  The pt was complaint with med pass, no prn meds administered  The pt attended groups today and was observed in the milieu playing cards and socializing with his peers

## 2023-02-16 NOTE — PROGRESS NOTES
RING Group Note     02/16/23 1400   Activity/Group Checklist   Group Personal control  (Mindfulness Techniques - Awareness with Sequencing Cards and 5 Senses Grounding)   Attendance Attended   Attendance Duration (min) Greater than 60   Interactions Interacted appropriately   Affect/Mood Appropriate;Bright   Goals Achieved Identified feelings; Identified triggers; Discussed coping strategies; Able to listen to others; Able to engage in interactions; Able to reflect/comment on own behavior;Able to recieve feedback; Able to give feedback to another

## 2023-02-17 RX ADMIN — THIAMINE HCL TAB 100 MG 100 MG: 100 TAB at 08:03

## 2023-02-17 RX ADMIN — GABAPENTIN 200 MG: 100 CAPSULE ORAL at 16:40

## 2023-02-17 RX ADMIN — MIRTAZAPINE 22.5 MG: 7.5 TABLET, FILM COATED ORAL at 21:08

## 2023-02-17 RX ADMIN — FOLIC ACID 1 MG: 1 TABLET ORAL at 08:03

## 2023-02-17 RX ADMIN — NICOTINE 21 MG: 21 PATCH, EXTENDED RELEASE TRANSDERMAL at 08:03

## 2023-02-17 RX ADMIN — GLYCERIN, HYPROMELLOSE, POLYETHYLENE GLYCOL 1 DROP: .2; .2; 1 LIQUID OPHTHALMIC at 08:37

## 2023-02-17 RX ADMIN — GABAPENTIN 200 MG: 100 CAPSULE ORAL at 08:03

## 2023-02-17 RX ADMIN — Medication 3 MG: at 21:09

## 2023-02-17 RX ADMIN — MULTIPLE VITAMINS W/ MINERALS TAB 1 TABLET: TAB ORAL at 08:03

## 2023-02-17 RX ADMIN — GABAPENTIN 200 MG: 100 CAPSULE ORAL at 21:08

## 2023-02-17 NOTE — PROGRESS NOTES
02/17/23 0925   Activity/Group Checklist   Group Community meeting   Attendance Attended   Attendance Duration (min) 16-30   Interactions Interacted appropriately   Affect/Mood Appropriate   Goals Achieved Displayed empathy;Able to listen to others; Able to engage in interactions; Able to self-disclose; Able to recieve feedback; Able to give feedback to another

## 2023-02-17 NOTE — NURSING NOTE
Pt has been pleasant/cooperative throughout the day  Pt states the medications he is currently taking has greatly improved his depression and anxiety  Pt also states that he is no longer experiencing racing thoughts and is able to complete tasks  Pt also reports feeling proud of himself because he is able to work through his anxiety and difficult times "without a pill  I don't need to get high, I can do it on my own " Pt is looking forward to discharge and is hopeful for the future  Pt does report some restlessness throughout the night last night, but overall is having no issues with sleep or appetite  Pt has been compliant with all meals and medications  Pt denies and SI/HI/AH/  Staff availability reinforced

## 2023-02-17 NOTE — PROGRESS NOTES
Progress Note - Behavioral Health   Frankie Snow 43 y o  male MRN: [id number]  Unit/Bed#: [facility] 350-02 Encounter: [id number]    Assessment/Plan   Principal Problem:    Current severe episode of major depressive disorder without psychotic features without prior episode ([facility] 75 )  Active Problems:    Medical clearance for psychiatric admission    Chronic hepatitis C without hepatic coma ([facility] 75 )    Alcohol use disorder, severe, dependence ([place] [address] )    Severe protein-calorie malnutrition ([place] [address] )      Recommended Treatment:   1  Continue gabapentin 200 mg TID for anxiety and pain management  If required, consider increase to 300 mg TID in future  2  Continue Remeron 22 5 mg QHS  3  Continue melatonin 3 mg QHS for insomnia  Continue to encourage group attendance and to remain visible on the unit  Continue frequent safety checks and vitals per unit protocol  Case discussed with treatment team   Continue SLIM medical management as indicated  CM to continue care coordination and disposition planning  Risks, benefits and possible side effects of Medications: Risks, benefits, and possible side effects of medications have been explained to the patient, who verbalizes understanding    Legal Status: 201  ------------------------------------------------------------    Subjective: Over night per nursing report, Barak Dawson was cooperative on the unit and compliant with medications  He reportedly told nursing he felt much better and was looking forward to MT  Today, Barak Dawson is consenting for safety on the unit  He reports feeling "excellent " Barak Dawson notes having adequate sleep, though not as restful as the previous night  He estimates getting about 5-6 hours but was "up and down"  He reports some restlessness overnight  Barak Dawson states having a normal appetite and has been feeling more sated with the addition of double protein to his meals   Barak Dawson has been taking the medications as prescribed and reporting feeling a "bit slowed down" with increased dose of gabapentin, which he believes is helpful in making him feel more "relaxed "  He notes feeling somewhat groggy but says this may also be due to not sleeping as well last night  He did not require PRN tylenol last night and believes his nerve pain is improved  He describes his energy as "good "     Destinee Mathew denies suicidal ideations and has no plan  Destinee Mathew denies homicidal ideations  Destinee Mathew contracts for safety on the unit  Regarding hallucinations, Destinee Mathew denies auditory and visual hallucinations  PRNs overnight: None required   VS: Reviewed, within normal limits    Progress Toward Goals: Improvement    Psychiatric Review of Systems:  Behavior over the last 24 hours: Improved; more pleasant and cooperative on the unit, less easily agitated with staff  Sleep: Slept restlessly but estimates he still got about 7 hours  Feels well-rested today  Appetite: normal compared to patient baseline  Medication side effects: mild grogginess  ROS: Complete review of systems is negative except as noted above  Vital signs in last 24 hours:  Temp:  [97 2 °F (36 2 °C)-98 6 °F (37 °C)] 97 2 °F (36 2 °C)  HR:  [] 90  Resp:  [16] 16  BP: (108-139)/(58-95) 108/58    Mental Status Exam:  Appearance:  Tall, thin, overtly-appearing  male, appears older than stated age  Dressed casually in t-shirt, gym shorts, socks and slippers  Hygiene adequate  Behavior:  Cooperative, calm, easily redirectable  Less anxious appearing today, no PMA or slowing  Maintained appropriate eye contact throughout interview  Less restless and figdety   Speech:  Normal volume and pitch  Rate decreased compared to yesterday, easily interruptible  Less hyper-verbal as compared to previous days     Mood:  "Excellent"   Affect:  slightly brighter, less constricted   Less anxious   Thought Process:  organized, logical, coherent, linear   Associations: intact associations   Thought Content:  no overt delusions   Perceptual Disturbances: Denies auditory or visual hallucinations and Does not appear to be responding to internal stimuli   Risk Potential: Suicidal ideation - None, contracts for safety on the unit  Homicidal ideation - None  Potential for aggression - Not at present   Sensorium:  oriented to person, place and time/date   Memory:  recent and remote memory grossly intact   Consciousness:  alert and awake   Attention/Concentration: attention span and concentration are age appropriate   Insight:  Improving   Judgment: Improving   Gait/Station: normal gait/station   Motor Activity: no abnormal movements     Current Medications:  Current Facility-Administered Medications   Medication Dose Route Frequency Provider Last Rate   • acetaminophen  650 mg Oral Q6H PRN Glendy Im, DO     • acetaminophen  650 mg Oral Q4H PRN Glendy Im, DO     • acetaminophen  975 mg Oral Q6H PRN Glendy Im, DO     • aluminum-magnesium hydroxide-simethicone  30 mL Oral Q4H PRN Glendy Im, DO     • haloperidol lactate  2 5 mg Intramuscular Q4H PRN Max 4/day Glendy Im, DO      And   • LORazepam  1 mg Intramuscular Q4H PRN Max 4/day Glendy Im, DO      And   • benztropine  0 5 mg Intramuscular Q4H PRN Max 4/day Glendy Im, DO     • haloperidol lactate  5 mg Intramuscular Q4H PRN Max 4/day Glendy Im, DO      And   • LORazepam  2 mg Intramuscular Q4H PRN Max 4/day Glendy Im, DO      And   • benztropine  1 mg Intramuscular Q4H PRN Max 4/day Glendy Im, DO     • benztropine  1 mg Intramuscular BID PRN Glendy Im, DO     • benztropine  1 mg Oral BID PRN Glendy Im, DO     • hydrOXYzine HCL  50 mg Oral Q6H PRN Max 4/day Glendy Im, DO      Or   • diphenhydrAMINE  50 mg Intramuscular Q6H PRN Glendy Im, DO     • folic acid  1 mg Oral Daily Ismael Tierney, DO     • gabapentin  200 mg Oral TID Cindi Glass, DO     • glycerin-hypromellose-  1 drop Both Eyes Q3H PRN Glendy Im, DO     • haloperidol 1 mg Oral Q6H PRN Marijean Beth, DO     • haloperidol  2 5 mg Oral Q4H PRN Max 4/day Marijean Beth, DO     • haloperidol  5 mg Oral Q4H PRN Max 4/day Serrano Storm Ochoa, DO     • hydrOXYzine HCL  100 mg Oral Q6H PRN Max 4/day Marijean Beth, DO      Or   • LORazepam  2 mg Intramuscular Q6H PRN Marijean Beth, DO     • hydrOXYzine HCL  25 mg Oral Q6H PRN Max 4/day Serranoearnest Pizarroy, DO     • melatonin  3 mg Oral HS PRN Marijean Beth, DO     • melatonin  3 mg Oral HS Geovany Gaytan MD     • mirtazapine  22 5 mg Oral HS Fior Aguirre, DO     • multivitamin-minerals  1 tablet Oral Daily Milus Bald, DO     • nicotine  21 mg Transdermal Daily Kenny Holloway PA-C     • nicotine polacrilex  2 mg Oral Q2H PRN Marijean Beth, DO     • polyethylene glycol  17 g Oral Daily PRN Marijean Beth, DO     • propranolol  10 mg Oral Q8H PRN Marijean Beth, DO     • senna-docusate sodium  1 tablet Oral Daily PRN Marijean Beth, DO     • thiamine  100 mg Oral Daily Milus Bald, DO     • traZODone  50 mg Oral HS PRN Milus Bald, DO         Behavioral Health Medications: all current active meds have been reviewed  Changes as in plan section above  Laboratory results:  I have personally reviewed all pertinent laboratory/tests results  No results found for this or any previous visit (from the past 48 hour(s))         Magui Lockett

## 2023-02-17 NOTE — DISCHARGE INSTR - APPOINTMENTS
Tim Guerra or Vida, our Major and Claritza, will be calling you after your discharge, on the phone number that you provided  They will be available as an additional support, if needed  If you wish to speak with one of them, you may contact Jordan Cadet at [phone] or Salena Holly at [phone]

## 2023-02-17 NOTE — PROGRESS NOTES
02/17/23 1000   Activity/Group Checklist   Group Other (Comment)  (OPEN STUDIO Art Therapy/Social Group)   Attendance Attended   Attendance Duration (min) Greater than 60   Interactions Interacted appropriately   Affect/Mood Appropriate   Goals Achieved Able to listen to others; Able to engage in interactions

## 2023-02-17 NOTE — PROGRESS NOTES
02/17/23 1300   Activity/Group Checklist   Group   (recovery group)   Attendance Attended   Attendance Duration (min) 46-60   Interactions Interacted appropriately   Affect/Mood Appropriate   Goals Achieved Discussed self-esteem issues; Displayed empathy;Able to listen to others; Able to engage in interactions; Able to reflect/comment on own behavior;Able to self-disclose; Able to recieve feedback; Able to give feedback to another

## 2023-02-17 NOTE — DISCHARGE INSTR - OTHER ORDERS
If you are experiencing a mental health emergency, you may call the [address] [place] [address] 24 hours a day, 7 days per week at [phone]  In [facility], call [phone]  HOW TO GET SUBSTANCE ABUSE HELP:  If you or someone you know has a drug or alcohol problem, there is help:  Megha [address]: [address]: [phone]  An assessment is the first step  In addition to those listed there are other programs available in the area but assessment is best to determine an appropriate level of care  If you DO NOT have Medical Assistance ([place]) or [employer], an assessment can be scheduled at one of these providers:  [address] [facility]  Geovanni Aguirre 13, [address]  [phone]   [facility]  [address] , [place], [address]  [facility] 84  [address]  [facility]  R Josh Beckett 70  7[address] [facility], [facility]   [address] , [place], 98 [facility]  [address] [facility] [address] [facility] , [place], 98 [facility]  [address] [facility] , [address], [place], [address]  [phone]     If you [address], an assessment can be scheduled at one of these providers:  [facility]  [address] , [place], 98 [facility]  [address]  [facility] [address], [address]  [address]  D&A Intake Unit  620 [facility] [address] , 1st Floor, [place], [address]  [phone]  [address], 300 [facility],6Th Floor, [facility], 44[address] [facility] [address] [facility]  [address] , [place], [address]  [facility] 84  [address]  [facility]  [phone]   [facility])  [address], [place], [address]  197 [facility]  721 [facility], 105 [facility]   [address] , [facility], 98 [facility]  [id number] [place] [address] Shaila , [facility], 98 [facility]  [address] , [address], [facility], [address]  [phone]     If you [facility], an assessment can be scheduled at one of these providers  Please contact these Providers to determine if they are in your network plan:  [facility] D&A Intake Unit  620 [facility] [address] , 1st Floor, [place], [place]  [address] [facility]  [address] [place] [address] , [facility], [address]  [phone]   [address]  [facility]  [phone]   [address], [place], [place]  197 [place] [address] [facility], [facility]  [address] , [address] Judy Hughes, [facility], [address] [facility]      When you need someone to listen, the Greg Salm is available for 16 hours a day, 7 days a week, from the time of 7-10am and 2pm-2am   It is not available from the hours of 2am-6am and 10am-2pm  A representative can be reached at [phone]

## 2023-02-17 NOTE — NURSING NOTE
Pt visible on unit playing cards and watching tv  Social with peers  Denies SI/HI and hallucinations on assessment  Denies needs or concerns  No behavioral concerns or need for PRN meds  Compliant with unit routines and treatment  Safety checks continue

## 2023-02-17 NOTE — PLAN OF CARE
Pt completed [facility] TALHA liability appt over the phone  Pt eligible for [facility] services  Since pt is interested in dual services, pt will be seen at [facility] is closed for the day  CM will call Monday to obtain the appt  CM left VM for pt's brother to notify of dc planned for Monday

## 2023-02-17 NOTE — PROGRESS NOTES
02/16/23 1000   Activity/Group Checklist   Group Other (Comment)  (Group Art Therapy/Psychodynamic, Creative Response to Trauma Recovery Discussion)   Attendance Attended   Attendance Duration (min) Greater than 60   Interactions Interacted appropriately   Affect/Mood Appropriate   Goals Achieved Discussed coping strategies; Increased hopefulness; Able to listen to others; Able to engage in interactions; Able to recieve feedback; Able to give feedback to another  (Able to engage materials; full participation with discussion)

## 2023-02-18 RX ADMIN — GABAPENTIN 200 MG: 100 CAPSULE ORAL at 21:25

## 2023-02-18 RX ADMIN — GABAPENTIN 200 MG: 100 CAPSULE ORAL at 15:51

## 2023-02-18 RX ADMIN — NICOTINE 21 MG: 21 PATCH, EXTENDED RELEASE TRANSDERMAL at 08:24

## 2023-02-18 RX ADMIN — MULTIPLE VITAMINS W/ MINERALS TAB 1 TABLET: TAB ORAL at 08:24

## 2023-02-18 RX ADMIN — GABAPENTIN 200 MG: 100 CAPSULE ORAL at 08:24

## 2023-02-18 RX ADMIN — Medication 3 MG: at 21:25

## 2023-02-18 RX ADMIN — FOLIC ACID 1 MG: 1 TABLET ORAL at 08:24

## 2023-02-18 RX ADMIN — GLYCERIN, HYPROMELLOSE, POLYETHYLENE GLYCOL 1 DROP: .2; .2; 1 LIQUID OPHTHALMIC at 08:26

## 2023-02-18 RX ADMIN — THIAMINE HCL TAB 100 MG 100 MG: 100 TAB at 08:24

## 2023-02-18 RX ADMIN — MIRTAZAPINE 22.5 MG: 7.5 TABLET, FILM COATED ORAL at 21:25

## 2023-02-18 NOTE — PROGRESS NOTES
Progress Note - Behavioral Health   Chloe Reyes 43 y o  male MRN: [id number]  Unit/Bed#: [facility] 350-02 Encounter: [id number]    Assessment/Plan   Principal Problem:    Current severe episode of major depressive disorder without psychotic features without prior episode ([facility] 75 )  Active Problems:    Medical clearance for psychiatric admission    [facility] C without hepatic [facility]    Alcohol use disorder, severe, dependence ([facility] 75 )    Severe protein-calorie malnutrition ([facility] 75 )    Per staff report, pt attended group and interacted appropriately  He was in/out of community and social with peers denying symptoms  Today, patient notes that he is adjusting to his medications and that he is feeling he is tolerating it better  He notes the increase to gabapentin has also been helping with some of his pain but also with his anxiety  He does have some slight increase in rate and is talkative but does not appear pressured and is able to appropriately stop to listen in conversation  He continues to look forward to his upcoming discharge  No medication adjustments at this time and will continue to monitor  Recommended Treatment:   Continue Remeron 22 5 mg at bedtime for mood/insomnia  Continue melatonin 3 mg at bedtime for insomnia  Continue Gabapentin 200mg TID for anxiety/mood/pain  Order for EKG     Continue with group therapy, milieu therapy and occupational therapy  Continue frequent safety checks and vitals per unit protocol    Case discussed with treatment team   Continue with SLIM medical management as indicated  Continue coordinating with case management regarding disposition  Risks, benefits and possible side effects of Medications: Risks, benefits, and possible side effects of medications have been explained to the patient, who verbalizes understanding    Legal Status: 201  ------------------------------------------------------------    Subjective: Per nursing report, [facility] has been cooperative on the unit and compliant with medications  Today, the patient reports feeling "pretty good "  He notes that he was able to sleep roughly 4 hours total but notes that this is an improvement  He notes that he had to use the restroom few times during the night but is unsure why  He does note that he drinks a decent amount of fluids throughout the day and was recommended to drink last prior to bedtime  He denies any issues with his medications  He notes that the medications have been helping and they have also assisted with his back pain  He notes that his neck pain will always be chronic  He feels his thoughts are overall "calm" and does not feel like they are racing  He notes that there is not too much to do so he was just lying in bed  He denies any suicidal or homicidal ideations  He denies any auditory or visual hallucinations  He feels excited about getting ready for his discharge  PRNs overnight: Artificial tears  VS: Reviewed, within normal limits    Progress Toward Goals: slow improvement    Psychiatric Review of Systems:  Behavior over the last 24 hours: improved  Sleep: improving  Appetite: adequate, normal compared to baseline  Medication side effects: none verbalized  ROS: Chronic pain, Complete review of systems is negative except as noted above  Vital signs in last 24 hours:  Temp:  [97 2 °F (36 2 °C)-97 8 °F (36 6 °C)] 97 8 °F (36 6 °C)  HR:  [] 100  Resp:  [16] 16  BP: (133-142)/(70-86) 133/70    Mental Status Exam:  Appearance:    Thin appearing,  male, shirtless, wrapped in blankets, balding on top of head   Behavior:  cooperative, calm, pleasant   Speech:  normal volume, normal pitch, some increased rate, talkative but redirectable   Mood:  "Pretty good"   Affect:  brighter, less constricted   Thought Process:  organized, logical, goal directed, linear   Associations: intact associations   Thought Content:  no overt delusions   Perceptual Disturbances: Denies auditory or visual hallucinations and Does not appear to be responding to internal stimuli   Risk Potential: Suicidal ideation - None at present  Homicidal ideation - None at present  Potential for aggression - Not at present   Sensorium:  oriented to person, place and time/date   Memory:  recent and remote memory grossly intact   Consciousness:  alert and awake   Attention/Concentration: attention span and concentration are age appropriate   Insight:  improving and limited   Judgment: improving and limited   Gait/Station: in bed   Motor Activity: no abnormal movements     Current Medications:  Current Facility-Administered Medications   Medication Dose Route Frequency Provider Last Rate   • acetaminophen  650 mg Oral Q6H PRN Elizabeth Abu, DO     • acetaminophen  650 mg Oral Q4H PRN Elizabeth Abu, DO     • acetaminophen  975 mg Oral Q6H PRN Elizabeth Abu, DO     • aluminum-magnesium hydroxide-simethicone  30 mL Oral Q4H PRN Elizabeth Abu, DO     • haloperidol lactate  2 5 mg Intramuscular Q4H PRN Max 4/day Elizabeth Abu, DO      And   • LORazepam  1 mg Intramuscular Q4H PRN Max 4/day Elizabeth Abu, DO      And   • benztropine  0 5 mg Intramuscular Q4H PRN Max 4/day Elizabeth Abu, DO     • haloperidol lactate  5 mg Intramuscular Q4H PRN Max 4/day Elizabeth Abu, DO      And   • LORazepam  2 mg Intramuscular Q4H PRN Max 4/day Elizabeth Abu, DO      And   • benztropine  1 mg Intramuscular Q4H PRN Max 4/day Elizabeth Abu, DO     • benztropine  1 mg Intramuscular BID PRN Elizabeth Abu, DO     • benztropine  1 mg Oral BID PRN Elizabeth Abu, DO     • hydrOXYzine HCL  50 mg Oral Q6H PRN Max 4/day Elizabeth Abu, DO      Or   • diphenhydrAMINE  50 mg Intramuscular Q6H PRN Elizabeth Abu, DO     • folic acid  1 mg Oral Daily Rebecca Jean, DO     • gabapentin  200 mg Oral TID Mandy Hamman, DO     • glycerin-hypromellose-  1 drop Both Eyes Q3H PRN Elizabeth Abu, DO     • haloperidol  1 mg Oral Q6H PRN Bethanie Bhatt Gabriela, DO     • haloperidol  2 5 mg Oral Q4H PRN Max 4/day Larri Spore, DO     • haloperidol  5 mg Oral Q4H PRN Max 4/day Marina Jaron Ochoa, DO     • hydrOXYzine HCL  100 mg Oral Q6H PRN Max 4/day Larri Spore, DO      Or   • LORazepam  2 mg Intramuscular Q6H PRN Larri Spore, DO     • hydrOXYzine HCL  25 mg Oral Q6H PRN Max 4/day Marina Jaron Ochoa, DO     • melatonin  3 mg Oral HS PRN Larri Spore, DO     • melatonin  3 mg Oral HS Lennox Norton, MD     • mirtazapine  22 5 mg Oral HS Angela Lands, DO     • multivitamin-minerals  1 tablet Oral Daily Randal Smolder, DO     • nicotine  21 mg Transdermal Daily Johann Menard PA-C     • nicotine polacrilex  2 mg Oral Q2H PRN Larri Spore, DO     • polyethylene glycol  17 g Oral Daily PRN Larri Spore, DO     • propranolol  10 mg Oral Q8H PRN Larri Spore, DO     • senna-docusate sodium  1 tablet Oral Daily PRN Larri Spore, DO     • thiamine  100 mg Oral Daily Randal Smolder, DO     • traZODone  50 mg Oral HS PRN Randal Smolder, DO         Behavioral Health Medications: all current active meds have been reviewed  Changes as in plan section above  Laboratory results:  I have personally reviewed all pertinent laboratory/tests results  No results found for this or any previous visit (from the past 48 hour(s))  Counseling / Coordination of Care:   Total floor / unit time spent today 45 minutes  Greater than 50% of total time was spent with the patient and / or family counseling and / or coordination of care  This note has been constructed using a voice recognition system  There may be translation, syntax, or grammatical errors  If you have any questions, please contact the dictating author      Gilberto Whitney DO

## 2023-02-18 NOTE — NURSING NOTE
Patient is in and out of the community  Patient is social with peers  Patient denied SI/AVH at this time  Patient was compliant with scheduled medications  Staff to maintain continuous rounding for safety and support

## 2023-02-18 NOTE — NURSING NOTE
No significant change with pt throughout the day  He is observed to be pleasant and social  He engages well in conversation  Pt states that he is sleeping well and denies any issues with his appetite  Pt is complaint with all meals and medications  Pt showered/tended to his ADLs without prompting from staff  Pt reports that his depression and anxiety are "much better " He feels ready for discharge and is hopeful that he can leave on Monday  Encouragement provided  Pt denies any SI/HI/AH/VH or thoughts of self harm  Staff availability reinforced

## 2023-02-19 LAB
ATRIAL RATE: 89 BPM
P AXIS: 64 DEGREES
PR INTERVAL: 126 MS
QRS AXIS: 79 DEGREES
QRSD INTERVAL: 84 MS
QT INTERVAL: 324 MS
QTC INTERVAL: 394 MS
T WAVE AXIS: 61 DEGREES
VENTRICULAR RATE: 89 BPM

## 2023-02-19 RX ADMIN — THIAMINE HCL TAB 100 MG 100 MG: 100 TAB at 08:28

## 2023-02-19 RX ADMIN — GLYCERIN, HYPROMELLOSE, POLYETHYLENE GLYCOL 1 DROP: .2; .2; 1 LIQUID OPHTHALMIC at 08:28

## 2023-02-19 RX ADMIN — GABAPENTIN 200 MG: 100 CAPSULE ORAL at 08:28

## 2023-02-19 RX ADMIN — NICOTINE 21 MG: 21 PATCH, EXTENDED RELEASE TRANSDERMAL at 08:28

## 2023-02-19 RX ADMIN — MIRTAZAPINE 22.5 MG: 7.5 TABLET, FILM COATED ORAL at 21:26

## 2023-02-19 RX ADMIN — MULTIPLE VITAMINS W/ MINERALS TAB 1 TABLET: TAB ORAL at 08:28

## 2023-02-19 RX ADMIN — FOLIC ACID 1 MG: 1 TABLET ORAL at 08:28

## 2023-02-19 RX ADMIN — Medication 3 MG: at 21:26

## 2023-02-19 RX ADMIN — GABAPENTIN 200 MG: 100 CAPSULE ORAL at 21:26

## 2023-02-19 RX ADMIN — GABAPENTIN 200 MG: 100 CAPSULE ORAL at 16:01

## 2023-02-19 NOTE — NURSING NOTE
Pt is pleasant and cooperative  Pt is med compliant  Pt isolative to room and stated "the dinner got me running to the bathroom so I don't want to leave"  Pt refused anything to help  Pt denies all psych symptoms  Will continue to monitor

## 2023-02-19 NOTE — NURSING NOTE
Pt has been pleasant and cooperative throughout the day  He is observed to be visible and social  Pt showered/completed ADLs without prompting from staff  Pt states "I'm a little nervous but I am ready " He is looking forward to his pending discharge  Pt denies any issues with his sleep or appetite  Pt reports feeling sick after dinner last night but feels it was due to the food  No further complaints throughout the day  Pt compliant with all meals and medications  Pt denies any SI/HI/AH/VH  Staff availability reinforced  Pt reporting some anxiety r/t pending discharge  Pt states that he is his "own worst enemy" and after discharge will be difficult  He states that he wants to change for himself and his future which is different than any other time he has been in rehab - because those times were forced  Pt mentions journaling and coloring as a coping skill  Emotional support provided

## 2023-02-19 NOTE — PROGRESS NOTES
Progress Note - Behavioral Health   Alyssa Berkowitz 43 y o  male MRN: [id number]  Unit/Bed#: [facility] 350-02 Encounter: [id number]    Assessment/Plan   Principal Problem:    Current severe episode of major depressive disorder without psychotic features without prior episode ([facility] 75 )  Active Problems:    Medical clearance for psychiatric admission    Chronic hepatitis C without hepatic coma ([facility] 75 )    Alcohol use disorder, severe, dependence ([facility] 75 )    Severe protein-calorie malnutrition ([facility] )    Per staff report, patient was pleasant and social engaging well with conversations  He told staff his depression on anxiety were doing much better being hopeful about his discharge, but noted after dinner having to "run to the bathroom "  Today, patient notes doing well  He notes that yesterday he ate a burger that made him extremely gassy and gave him discomfort but he is doing well today  He continues to look forward to discharge with the goal of staying with his brother where he can attend an outpatient rehab services  No changes at this time with anticipation of upcoming discharge  Recommended Treatment:   Continue Remeron 22 5 mg at bedtime for mood/insomnia  Continue Melatonin 3 mg at bedtime for insomnia  Continue Gabapentin 200mg TID for anxiety/mood/pain  Continue remaining meds    Continue with group therapy, milieu therapy and occupational therapy  Continue frequent safety checks and vitals per unit protocol    Case discussed with treatment team   Continue with [facility] medical management as indicated  Continue coordinating with case management regarding disposition  Risks, benefits and possible side effects of Medications: Risks, benefits, and possible side effects of medications have been explained to the patient, who verbalizes understanding    Legal Status: 201  ------------------------------------------------------------    Subjective: Per nursing report, Manuel Barcenas has been cooperative on the unit and compliant with medications  Today, Flakita Nelson is consenting for safety on the unit  He reports feeling " good " Flakita Nelson notes having roughly 4 or 5 hours of sleep with a few awakenings that included going to the bathroom but was able to fall back to sleep each time  He notes that he had multiple dreams including one where he felt he was in college and everyone was drinking except for him  He notes at home he receives roughly 5 or 6 hours of sleep and there is still awakening at home  Flakita Nelson states having no issues with his appetite  He notes that yesterday after eating a burger he started noticing his stomach feel off  He notes he ate some fries and mashed potatoes but could not finish the burger because he did not feel right  He notes it was not diarrhea or vomiting but he felt it was "gas more than anything "  He notes today that has since resolved  Flakita Nelson has been taking the medications as prescribed and reporting no noticeable side effects  He feels he is "more adjusted" to the medications  He states that his goal is to be discharged where he can stay with his brother  [facility]  He notes that his brother can take him to any of his appointments and also help him stay on track  He denies any suicidal or homicidal ideations  He denies any auditory or visual hallucinations  PRNs overnight: Artificial tears  VS: Reviewed, within normal limits    Progress Toward Goals: slow improvement    Psychiatric Review of Systems:  Behavior over the last 24 hours: improved  Sleep: improving and frequent awakenings  Appetite: adequate, normal compared to baseline  Medication side effects: none verbalized  ROS: chronic pain which is unchanged from baseline, Complete review of systems is negative except as noted above      Vital signs in last 24 hours:  Temp:  [97 7 °F (36 5 °C)-97 8 °F (36 6 °C)] 97 7 °F (36 5 °C)  HR:  [] 95  Resp:  [16] 16  BP: (126-133)/(71) 126/71    Mental Status Exam:  Appearance:   Thin appearing  male, long dark pants with shorts on top, long sleeve dark shirt, Rugrats slippers, headphones around neck   Behavior:  pleasant, cooperative, calm   Speech:  normal volume, normal pitch, times increased rate but redirectable   Mood:  "Good"   Affect:  brighter, reactive   Thought Process:  organized, logical, goal directed, linear   Associations: intact associations   Thought Content:  no overt delusions   Perceptual Disturbances: Denies auditory or visual hallucinations   Risk Potential: Suicidal ideation - None at present  Homicidal ideation - None at present  Potential for aggression - Not at present   Sensorium:  oriented to person, place and time/date   Memory:  recent and remote memory grossly intact   Consciousness:  alert and awake   Attention/Concentration: attention span and concentration are age appropriate   Insight:  limited and improving   Judgment: fair   Gait/Station: normal gait/station, normal balance   Motor Activity: no abnormal movements     Current Medications:  Current Facility-Administered Medications   Medication Dose Route Frequency Provider Last Rate   • acetaminophen  650 mg Oral Q6H PRN Geraldean Glimpse, DO     • acetaminophen  650 mg Oral Q4H PRN Geraldean Glimpse, DO     • acetaminophen  975 mg Oral Q6H PRN Geraldean Glimpse, DO     • aluminum-magnesium hydroxide-simethicone  30 mL Oral Q4H PRN Geraldean Glimpse, DO     • haloperidol lactate  2 5 mg Intramuscular Q4H PRN Max 4/day Geraldean Glimpse, DO      And   • LORazepam  1 mg Intramuscular Q4H PRN Max 4/day Geraldean Glimpse, DO      And   • benztropine  0 5 mg Intramuscular Q4H PRN Max 4/day Geraldean Glimpse, DO     • haloperidol lactate  5 mg Intramuscular Q4H PRN Max 4/day Geraldean Glimpse, DO      And   • LORazepam  2 mg Intramuscular Q4H PRN Max 4/day Geraldean Glimpse, DO      And   • benztropine  1 mg Intramuscular Q4H PRN Max 4/day Geraldean Glimpse, DO     • benztropine  1 mg Intramuscular BID PRN Isha Carter HEIDI Cloney, DO     • benztropine  1 mg Oral BID PRN Tamsen Meigs, DO     • hydrOXYzine HCL  50 mg Oral Q6H PRN Max 4/day Tamsen Meigs, DO      Or   • diphenhydrAMINE  50 mg Intramuscular Q6H PRN Tamsen Meigs, DO     • folic acid  1 mg Oral Daily Verner Glendale, DO     • gabapentin  200 mg Oral TID Leny Amis, DO     • glycerin-hypromellose-  1 drop Both Eyes Q3H PRN Tamsen Meigs, DO     • haloperidol  1 mg Oral Q6H PRN Tamsen Meigs, DO     • haloperidol  2 5 mg Oral Q4H PRN Max 4/day Tamsen Meigs, DO     • haloperidol  5 mg Oral Q4H PRN Max 4/day Gucci Fast Cloney, DO     • hydrOXYzine HCL  100 mg Oral Q6H PRN Max 4/day Gucci Fast Cloney, DO      Or   • LORazepam  2 mg Intramuscular Q6H PRN Tamsen Meigs, DO     • hydrOXYzine HCL  25 mg Oral Q6H PRN Max 4/day Gucci Fast Cloney, DO     • melatonin  3 mg Oral HS PRN Tamsen Meigs, DO     • melatonin  3 mg Oral HS Elpidio Esteves MD     • mirtazapine  22 5 mg Oral HS Jesika De Paz, DO     • multivitamin-minerals  1 tablet Oral Daily Verner Glendale, DO     • nicotine  21 mg Transdermal Daily Sania Warner PA-C     • nicotine polacrilex  2 mg Oral Q2H PRN Tamsen Meigs, DO     • polyethylene glycol  17 g Oral Daily PRN Tamsen Meigs, DO     • propranolol  10 mg Oral Q8H PRN Tamsen Meigs, DO     • senna-docusate sodium  1 tablet Oral Daily PRN Tamsen Meigs, DO     • thiamine  100 mg Oral Daily Verner Glendale, DO     • traZODone  50 mg Oral HS PRN Verner Glendale, DO         Behavioral Health Medications: all current active meds have been reviewed  Changes as in plan section above  Laboratory results:  I have personally reviewed all pertinent laboratory/tests results    Recent Results (from the past 48 hour(s))   ECG 12 lead    Collection Time: 02/18/23  5:03 PM   Result Value Ref Range    Ventricular Rate 89 BPM    Atrial Rate 89 BPM    PA Interval 126 ms    QRSD Interval 84 ms    QT Interval 324 ms    QTC Interval 394 ms    P Axis 64 degrees    QRS Axis 79 degrees    T Wave [place] 61 degrees      Counseling / Coordination of Care:   Total floor / unit time spent today 45 minutes  Greater than 50% of total time was spent with the patient and / or family counseling and / or coordination of care        This note has been constructed using a voice recognition system  There may be translation, syntax, or grammatical errors  If you have any questions, please contact the dictating author      Mandy Hamman, DO

## 2023-02-20 VITALS
BODY MASS INDEX: 19.99 KG/M2 | WEIGHT: 142.8 LBS | OXYGEN SATURATION: 100 % | SYSTOLIC BLOOD PRESSURE: 112 MMHG | HEART RATE: 100 BPM | RESPIRATION RATE: 16 BRPM | HEIGHT: 71 IN | DIASTOLIC BLOOD PRESSURE: 60 MMHG | TEMPERATURE: 97.7 F

## 2023-02-20 PROBLEM — Z00.8 MEDICAL CLEARANCE FOR PSYCHIATRIC ADMISSION: Status: RESOLVED | Noted: 2023-02-09 | Resolved: 2023-02-20

## 2023-02-20 RX ORDER — THIAMINE MONONITRATE (VIT B1) 100 MG
100 TABLET ORAL DAILY
Qty: 30 TABLET | Refills: 1 | Status: SHIPPED | OUTPATIENT
Start: 2023-02-21 | End: 2023-04-22

## 2023-02-20 RX ORDER — GABAPENTIN 100 MG/1
200 CAPSULE ORAL 3 TIMES DAILY
Qty: 180 CAPSULE | Refills: 1 | Status: SHIPPED | OUTPATIENT
Start: 2023-02-20 | End: 2023-04-21

## 2023-02-20 RX ORDER — LANOLIN ALCOHOL/MO/W.PET/CERES
3 CREAM (GRAM) TOPICAL
Qty: 30 TABLET | Refills: 1 | Status: SHIPPED | OUTPATIENT
Start: 2023-02-20 | End: 2023-04-21

## 2023-02-20 RX ORDER — MIRTAZAPINE 7.5 MG/1
22.5 TABLET, FILM COATED ORAL
Qty: 90 TABLET | Refills: 1 | Status: SHIPPED | OUTPATIENT
Start: 2023-02-20 | End: 2023-04-21

## 2023-02-20 RX ORDER — FOLIC ACID 1 MG/1
1 TABLET ORAL DAILY
Qty: 30 TABLET | Refills: 1 | Status: SHIPPED | OUTPATIENT
Start: 2023-02-21 | End: 2023-04-22

## 2023-02-20 RX ADMIN — FOLIC ACID 1 MG: 1 TABLET ORAL at 08:06

## 2023-02-20 RX ADMIN — THIAMINE HCL TAB 100 MG 100 MG: 100 TAB at 08:06

## 2023-02-20 RX ADMIN — GABAPENTIN 200 MG: 100 CAPSULE ORAL at 08:06

## 2023-02-20 RX ADMIN — MULTIPLE VITAMINS W/ MINERALS TAB 1 TABLET: TAB ORAL at 08:06

## 2023-02-20 RX ADMIN — NICOTINE 21 MG: 21 PATCH, EXTENDED RELEASE TRANSDERMAL at 08:06

## 2023-02-20 NOTE — PROGRESS NOTES
02/20/23 0859   Team Meeting   Meeting Type Daily Rounds   Team Members Present   Team Members Present Physician;Nurse;   Physician Team Member [id number] Team Member [facility] Management Team Member [place]   Patient/Family Present   Patient Present No   Patient's Family Present No     Dc today

## 2023-02-20 NOTE — PLAN OF CARE
Pt to dc today  Pt denies SI/HI, AVH  Pt oriented x3  Pt to return home and will be transported via lyft  Pt to follow up with Dual intake at [facility] on 2/21 at 0800  Meds to beds      dc address: [address] [facility], [address] [place] [address]  P: [phone]
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
## 2023-02-20 NOTE — PROGRESS NOTES
Discussed with patient: AUDIT score of 17 UDS/Identified Substance(s) used: Alcohol, THC, Adderall  Risks discussed included: Legal risks, health risks, mental health risks, financial risks, relationship conflict  Recommendations discussed: Inpatient vs outpatient rehab  Patient's response: Pt agreeable to dual outpatient services   Pt scheduled with Home for dual

## 2023-02-20 NOTE — NURSING NOTE
Pt has been bright and social in the milieu this evening  Pt was loud and laughing at times with peers and staff  Pt reports he is "giddy" that he had a good day minus a lunch time incident, where he got angry with staff about a wrap for his sandwich  Pt explained in detail what happened and then concluded that it was just a "misunderstanding/miscommunication "  Pt reports he is leaving tomorrow and is ready  Pt denies SI/HI/AH/VH  Pt reports discomfort in his neck, "but nothing you can do about it"  Pt denies any issues with appetite

## 2023-02-20 NOTE — NURSING NOTE
Patient awoke and completed all morning routines  Patient is looking forward to their upcoming discharge  Patient was medication and meal complaint this AM, and denies all psych symptoms  All discharge information was discussed with the patient, and patient stated that they will follow through with all discharge plans  Discharge medication was discussed with the patient, and patient stated that they would continue to take their medications as prescribed  All questions were answered at the time of discharge, and contact information was provided to the patient for any questions that may arise after discharge  When discussing smoking cessation patient stated that they are not ready to quit, and that they are aware of the resources available to them if they chooses to quit smoking

## 2023-02-20 NOTE — BH TRANSITION RECORD
Contact Information: If you have any questions, concerns, pended studies, tests and/or procedures, or emergencies regarding your inpatient behavioral health visit  Please contact [address] [place] [address] behavioral health unit [facility] [phone]  and ask to speak to a , nurse or physician  A contact is available 24 hours/ 7 days a week at this number  Summary of Procedures Performed During your Stay:  Below is a list of major procedures performed during your hospital stay and a summary of results:  - Cardiac Procedures/Studies: EKG - 2/18/23 Sinus rhythm with ectopic atrial rhythm noted at beginning  Pending Studies (From admission, onward)    None        Please follow up on the above pending studies with your PCP and/or referring provider

## 2023-12-14 ENCOUNTER — HOSPITAL ENCOUNTER (EMERGENCY)
Facility: HOSPITAL | Age: 43
Discharge: HOME | End: 2023-12-14
Attending: EMERGENCY MEDICINE | Admitting: EMERGENCY MEDICINE
Payer: OTHER GOVERNMENT

## 2023-12-14 DIAGNOSIS — L98.9 SCALP LESION: Primary | ICD-10-CM

## 2023-12-14 PROCEDURE — 99283 EMERGENCY DEPT VISIT LOW MDM: CPT | Performed by: EMERGENCY MEDICINE

## 2023-12-14 PROCEDURE — 99283 EMERGENCY DEPT VISIT LOW MDM: CPT
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
## 2024-01-11 ENCOUNTER — PATIENT OUTREACH (OUTPATIENT)
Facility: HOSPITAL | Age: 44
End: 2024-01-11

## 2024-01-11 DIAGNOSIS — N40.0 BENIGN PROSTATIC HYPERPLASIA, UNSPECIFIED WHETHER LOWER URINARY TRACT SYMPTOMS PRESENT: Primary | ICD-10-CM

## 2024-01-11 RX ORDER — TAMSULOSIN HYDROCHLORIDE 0.4 MG/1
0.4 CAPSULE ORAL
Qty: 30 CAPSULE | Refills: 2 | Status: SHIPPED | OUTPATIENT
Start: 2024-01-11

## 2024-01-16 ENCOUNTER — TELEMEDICINE (OUTPATIENT)
Dept: PSYCHIATRY | Facility: OTHER | Age: 44
End: 2024-01-16

## 2024-01-16 DIAGNOSIS — F41.1 GAD (GENERALIZED ANXIETY DISORDER): ICD-10-CM

## 2024-01-16 DIAGNOSIS — F33.2 SEVERE EPISODE OF RECURRENT MAJOR DEPRESSIVE DISORDER, WITHOUT PSYCHOTIC FEATURES (HCC): Primary | ICD-10-CM

## 2024-01-16 DIAGNOSIS — F10.21 ALCOHOL USE DISORDER, SEVERE, IN EARLY REMISSION (HCC): ICD-10-CM

## 2024-01-16 PROCEDURE — NC001 PR NO CHARGE: Performed by: PSYCHIATRY & NEUROLOGY

## 2024-01-16 RX ORDER — VENLAFAXINE HYDROCHLORIDE 37.5 MG/1
37.5 CAPSULE, EXTENDED RELEASE ORAL DAILY
Qty: 30 CAPSULE | Refills: 0 | Status: SHIPPED | OUTPATIENT
Start: 2024-01-16 | End: 2024-02-15

## 2024-01-16 RX ORDER — OLANZAPINE 2.5 MG/1
2.5 TABLET, FILM COATED ORAL
Qty: 30 TABLET | Refills: 0 | Status: SHIPPED | OUTPATIENT
Start: 2024-01-16 | End: 2024-02-15

## 2024-01-16 NOTE — BH TREATMENT PLAN
TREATMENT PLAN (Medication Management Only)        [facility]    Name and Date of Birth:  Richy Barraza 43 y.o. 1980  Date of Treatment Plan: January 16, 2024  Diagnosis/Diagnoses:    1. Severe episode of recurrent major depressive disorder, without psychotic features (HCC)    2. FERMIN (generalized anxiety disorder)    3. Alcohol use disorder, severe, in early remission (HCC)      Strengths/Personal Resources for Self-Care: ability to adapt to life changes, ability to communicate needs, ability to listen, ability to reason, motivation for treatment, ability to negotiate basic needs, self-reliance, willingness to work on problems, work skills.  Area/Areas of need (in own words): anxiety symptoms, depressive symptoms, mood swings, sleep problems, attention and concentration problems, anger control, relationship problems, substance abuse  1. Long Term Goal: alleviate depression and anxiety  Target Date:6 months - 7/16/2024  Person/Persons responsible for completion of goal: Dr. Isaura Lockhart or current [facility] nursing provider  2.  Short Term Objective (s) - How will we reach this goal?:   A. Provider new recommended medication/dosage changes and/or continue medication(s): Medication changes: I am having Richy Barraza start on OLANZapine and venlafaxine. I am also having him maintain his folic acid, gabapentin, mirtazapine, thiamine, and tamsulosin..  B. Attend medication management appointments regularly.  C. Take psychiatric medications responsibly.  Target Date:6 months - 7/16/2024  Person/Persons Responsible for Completion of Goal: Dr. Isaura Lockhart or current [facility] nursing provider  Progress Towards Goals: starting treatment  Treatment Modality: medication management every 1 week  Review due 180 days from date of this plan: 6 months - 7/16/2024  Expected length of service: ongoing treatment  My Physician/PA/NP and I have developed this plan together and I  agree to work on the goals and objectives. I understand the treatment goals that were developed for my treatment.

## 2024-01-16 NOTE — PSYCH
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 during the session: Yes - Treatment Plan done but not signed at time of office visit due to:  Plan reviewed by video and verbal consent given due to virtual visit.      Note Share Disclaimer:   This note was not shared with the patient due to reasonable likelihood of causing patient harm    Visit Time  Visit Start Time: 10:00 AM  Visit Stop Time: 11:00 AM  Total Visit Duration:  60 minutes    Vini Delarosa DO 01/16/24

## 2024-01-18 ENCOUNTER — OFFICE VISIT (OUTPATIENT)
Dept: FAMILY MEDICINE CLINIC | Facility: CLINIC | Age: 44
End: 2024-01-18

## 2024-01-18 VITALS
WEIGHT: 153.2 LBS | OXYGEN SATURATION: 96 % | TEMPERATURE: 97.6 F | DIASTOLIC BLOOD PRESSURE: 70 MMHG | HEART RATE: 88 BPM | SYSTOLIC BLOOD PRESSURE: 100 MMHG | BODY MASS INDEX: 21.37 KG/M2

## 2024-01-18 DIAGNOSIS — F33.2 SEVERE EPISODE OF RECURRENT MAJOR DEPRESSIVE DISORDER, WITHOUT PSYCHOTIC FEATURES (HCC): ICD-10-CM

## 2024-01-18 DIAGNOSIS — Q55.9 SCROTAL ANOMALY: ICD-10-CM

## 2024-01-18 DIAGNOSIS — B18.2 CHRONIC HEPATITIS C WITHOUT HEPATIC COMA (HCC): Primary | ICD-10-CM

## 2024-01-18 DIAGNOSIS — M54.40 CHRONIC BILATERAL LOW BACK PAIN WITH SCIATICA, SCIATICA LATERALITY UNSPECIFIED: ICD-10-CM

## 2024-01-18 DIAGNOSIS — Z11.4 SCREENING FOR HIV (HUMAN IMMUNODEFICIENCY VIRUS): ICD-10-CM

## 2024-01-18 DIAGNOSIS — R35.0 BENIGN PROSTATIC HYPERPLASIA WITH URINARY FREQUENCY: ICD-10-CM

## 2024-01-18 DIAGNOSIS — G89.29 CHRONIC BILATERAL LOW BACK PAIN WITH SCIATICA, SCIATICA LATERALITY UNSPECIFIED: ICD-10-CM

## 2024-01-18 DIAGNOSIS — N40.1 BENIGN PROSTATIC HYPERPLASIA WITH URINARY FREQUENCY: ICD-10-CM

## 2024-01-18 PROBLEM — M54.41 CHRONIC BILATERAL LOW BACK PAIN WITH SCIATICA: Status: ACTIVE | Noted: 2024-01-18

## 2024-01-18 PROBLEM — M54.42 CHRONIC BILATERAL LOW BACK PAIN WITH SCIATICA: Status: ACTIVE | Noted: 2024-01-18

## 2024-01-18 PROBLEM — Q55.20 SCROTAL ANOMALY: Status: ACTIVE | Noted: 2024-01-18

## 2024-01-18 PROCEDURE — 99203 OFFICE O/P NEW LOW 30 MIN: CPT | Performed by: FAMILY MEDICINE
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
## 2024-01-18 NOTE — PATIENT INSTRUCTIONS
^^^^^^^^^^^^^^^^^^^^^^^^^^^  Your safety and wellbeing as well as that of those around you is important to us. Should you or someone you know be in need, here are some area resources that may help:    Do you feel like you might be in crisis and potentially need professional services immediately?     [place] Suicide Prevention Lifeline: Dial #984  If you prefer to text, you can reach the Crisis Text Line by texting “[place]” to 238404    ¿Te encuentras en crisis? Envía un mensaje de texto con la palabra AYUDA al 382146 para comunicarte de manera gratuita con un Consejero de Crisis  Apoyo gratuito las 24 horas del día, los 7 días de la semana, al alcance de tu mano.    Alternatively, you can Visit https://www.dhs.[place].gov/Services/Mental-Health-In-[place]/Documents/Suicide_Prevention_Hotlines.pdf to find your [facility]'s 24/7 crisis phone line.    Are you feeling overwhelmed, want to speak with a supportive person (NOT for crisis), and/or are you looking for additional resources?     [facility] Warmline: Call (128) [phone].  This Warmline provides telephone service for Adult residents (18 years and older) of [facility] who need emotional support, help with problem solving, or information and referral. (Hours 7 Days a week 6:00 AM and 2:00 AM)    [facility]: 24/7 Helpline: 753.390.4323 or Toll-free: [phone] TTY: 556.643.3318 or online: BleepBleeps.org/our-services/  [facility] is a safe place where ALL survivors of domestic and intimate partner abuse and their children can find refuge. [facility] offers confidential resources to individuals and families including supportive mental health services and connection to resources. We provide services in [place] and [facility].    [place] Coalition Against Domestic Violence: Dial 1-870.407.2966 or Visit https://www.pcadv.org  Among the services provided to domestic violence victims are: crisis intervention; counseling;  accompaniment to police, medical, and court facilities; and temporary emergency shelter for victims and their dependent children. Prevention and educational programs are provided to lessen the risk of domestic violence in the community at large.    Find a local Alcohol Anonymous meeting: Dial 1-497.631.3451 or Visit https://www.aaAfricasana.[employer]/oj-efhwdkh-iqfeliop/[place]/  Find a local Narcotics Anonymous meeting: Visit https://www.na.org/meetingsearch/     [employer] is a website where you can look for accessible care and many other services including financial assistance, food pantries, medical care, and other free or reduced-cost resources in your area, even if you don't have insurance. Visit: https://[employer] and Technologycommunityhealth.[employer]/    Career link is your resource for all things job and employment related. [facility], [facility] [place] [address]  Walk in or call Monday through Friday 8:00AM - 4:30 PM: 776.274.6526/TTY: [phone]  www.career[facility].org    If you need to connect with resources in your community, but don't know where to look,  is a great place to start. From help with a utilities bill, to housing assistance, after-school programs for kids, and more, you can dial 211 or text your zip code to [facility] to talk with a  for free.    [facility]:  This resource assists individuals in need of connection to healthcare services of all types including primary care, substance dependence and acute psychiatric care on a limited basis.  Team Phone: [phone] or [phone]    [facility] Information & Referral Office is the entry point for [facility] Human Services departments and information about community resources. Professional Caseworkers will work with you to determination whether a referral requires further assessment and may either refer you to a community agency, or, complete a formal referral to a  Human Services specialized office (Aging and Adult Services, Children and Youth, Mental Health, Developmental Programs or Early Intervention).     [facility], [address] [place] [address], [place] [address]  Dial: [phone]  Walk-in hours are 8:30AM to 4:15PM, Monday through Friday-- no appointment is needed.    If you feel at risk of immediate harm to yourself or another, call 9-1-1 or go directly to the Emergency Department.  ^^^^^^^^^^^^^^^^^^^^^^^^^^^^^^

## 2024-01-19 ENCOUNTER — TELEPHONE (OUTPATIENT)
Age: 44
End: 2024-01-19

## 2024-01-19 ENCOUNTER — NURSE TRIAGE (OUTPATIENT)
Dept: PHYSICAL THERAPY | Facility: OTHER | Age: 44
End: 2024-01-19

## 2024-01-19 DIAGNOSIS — G89.29 CHRONIC BILATERAL LOW BACK PAIN WITH BILATERAL SCIATICA: Primary | ICD-10-CM

## 2024-01-19 DIAGNOSIS — M54.42 CHRONIC BILATERAL LOW BACK PAIN WITH BILATERAL SCIATICA: Primary | ICD-10-CM

## 2024-01-19 DIAGNOSIS — M54.41 CHRONIC BILATERAL LOW BACK PAIN WITH BILATERAL SCIATICA: Primary | ICD-10-CM

## 2024-01-19 NOTE — TELEPHONE ENCOUNTER
Patients GI provider:  WHITNEY Almeida    Number to return call: [phone]    Reason for call: Hep C    Scheduled procedure/appointment date if applicable: Appt 1/24/24
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
## 2024-01-22 ENCOUNTER — APPOINTMENT (OUTPATIENT)
Dept: LAB | Facility: CLINIC | Age: 44
End: 2024-01-22
Payer: COMMERCIAL

## 2024-01-22 DIAGNOSIS — F33.2 SEVERE EPISODE OF RECURRENT MAJOR DEPRESSIVE DISORDER, WITHOUT PSYCHOTIC FEATURES (HCC): ICD-10-CM

## 2024-01-22 DIAGNOSIS — Z11.4 SCREENING FOR HIV (HUMAN IMMUNODEFICIENCY VIRUS): ICD-10-CM

## 2024-01-22 DIAGNOSIS — B18.2 CHRONIC HEPATITIS C WITHOUT HEPATIC COMA (HCC): ICD-10-CM

## 2024-01-22 LAB
ALBUMIN SERPL BCP-MCNC: 4.7 G/DL (ref 3.5–5)
ALP SERPL-CCNC: 83 U/L (ref 34–104)
ALT SERPL W P-5'-P-CCNC: 96 U/L (ref 7–52)
ANION GAP SERPL CALCULATED.3IONS-SCNC: 11 MMOL/L
AST SERPL W P-5'-P-CCNC: 52 U/L (ref 13–39)
BASOPHILS # BLD AUTO: 0.12 THOUSANDS/ÂΜL (ref 0–0.1)
BASOPHILS NFR BLD AUTO: 1 % (ref 0–1)
BILIRUB SERPL-MCNC: 0.36 MG/DL (ref 0.2–1)
BUN SERPL-MCNC: 12 MG/DL (ref 5–25)
CALCIUM SERPL-MCNC: 10.2 MG/DL (ref 8.4–10.2)
CHLORIDE SERPL-SCNC: 101 MMOL/L (ref 96–108)
CHOLEST SERPL-MCNC: 156 MG/DL
CO2 SERPL-SCNC: 30 MMOL/L (ref 21–32)
CREAT SERPL-MCNC: 0.89 MG/DL (ref 0.6–1.3)
EOSINOPHIL # BLD AUTO: 0.57 THOUSAND/ÂΜL (ref 0–0.61)
EOSINOPHIL NFR BLD AUTO: 5 % (ref 0–6)
ERYTHROCYTE [DISTWIDTH] IN BLOOD BY AUTOMATED COUNT: 11.9 % (ref 11.6–15.1)
GFR SERPL CREATININE-BSD FRML MDRD: 104 ML/MIN/1.73SQ M
GLUCOSE P FAST SERPL-MCNC: 71 MG/DL (ref 65–99)
HCT VFR BLD AUTO: 50 % (ref 36.5–49.3)
HDLC SERPL-MCNC: 51 MG/DL
HGB BLD-MCNC: 16.6 G/DL (ref 12–17)
HIV 1+2 AB+HIV1 P24 AG SERPL QL IA: NORMAL
HIV 2 AB SERPL QL IA: NORMAL
HIV1 AB SERPL QL IA: NORMAL
HIV1 P24 AG SERPL QL IA: NORMAL
IMM GRANULOCYTES # BLD AUTO: 0.05 THOUSAND/UL (ref 0–0.2)
IMM GRANULOCYTES NFR BLD AUTO: 1 % (ref 0–2)
LDLC SERPL CALC-MCNC: 70 MG/DL (ref 0–100)
LYMPHOCYTES # BLD AUTO: 3.34 THOUSANDS/ÂΜL (ref 0.6–4.47)
LYMPHOCYTES NFR BLD AUTO: 32 % (ref 14–44)
MCH RBC QN AUTO: 32.1 PG (ref 26.8–34.3)
MCHC RBC AUTO-ENTMCNC: 33.2 G/DL (ref 31.4–37.4)
MCV RBC AUTO: 97 FL (ref 82–98)
MONOCYTES # BLD AUTO: 1.1 THOUSAND/ÂΜL (ref 0.17–1.22)
MONOCYTES NFR BLD AUTO: 11 % (ref 4–12)
NEUTROPHILS # BLD AUTO: 5.34 THOUSANDS/ÂΜL (ref 1.85–7.62)
NEUTS SEG NFR BLD AUTO: 50 % (ref 43–75)
NONHDLC SERPL-MCNC: 105 MG/DL
NRBC BLD AUTO-RTO: 0 /100 WBCS
PLATELET # BLD AUTO: 209 THOUSANDS/UL (ref 149–390)
PMV BLD AUTO: 11.2 FL (ref 8.9–12.7)
POTASSIUM SERPL-SCNC: 4.2 MMOL/L (ref 3.5–5.3)
PROT SERPL-MCNC: 7.5 G/DL (ref 6.4–8.4)
RBC # BLD AUTO: 5.17 MILLION/UL (ref 3.88–5.62)
SODIUM SERPL-SCNC: 142 MMOL/L (ref 135–147)
TRIGL SERPL-MCNC: 173 MG/DL
TSH SERPL DL<=0.05 MIU/L-ACNC: 3.85 UIU/ML (ref 0.45–4.5)
WBC # BLD AUTO: 10.52 THOUSAND/UL (ref 4.31–10.16)

## 2024-01-22 PROCEDURE — 80061 LIPID PANEL: CPT

## 2024-01-22 PROCEDURE — 36415 COLL VENOUS BLD VENIPUNCTURE: CPT

## 2024-01-22 PROCEDURE — 84443 ASSAY THYROID STIM HORMONE: CPT

## 2024-01-22 PROCEDURE — 87521 HEPATITIS C PROBE&RVRS TRNSC: CPT

## 2024-01-22 PROCEDURE — 80053 COMPREHEN METABOLIC PANEL: CPT

## 2024-01-22 PROCEDURE — 87389 HIV-1 AG W/HIV-1&-2 AB AG IA: CPT

## 2024-01-22 PROCEDURE — 87522 HEPATITIS C REVRS TRNSCRPJ: CPT

## 2024-01-22 PROCEDURE — 85025 COMPLETE CBC W/AUTO DIFF WBC: CPT

## 2024-01-23 ENCOUNTER — OFFICE VISIT (OUTPATIENT)
Dept: PSYCHIATRY | Facility: OTHER | Age: 44
End: 2024-01-23

## 2024-01-23 DIAGNOSIS — F41.1 GAD (GENERALIZED ANXIETY DISORDER): ICD-10-CM

## 2024-01-23 DIAGNOSIS — E55.9 VITAMIN D DEFICIENCY: Primary | ICD-10-CM

## 2024-01-23 DIAGNOSIS — F10.21 ALCOHOL USE DISORDER, SEVERE, IN EARLY REMISSION (HCC): ICD-10-CM

## 2024-01-23 DIAGNOSIS — F33.2 SEVERE EPISODE OF RECURRENT MAJOR DEPRESSIVE DISORDER, WITHOUT PSYCHOTIC FEATURES (HCC): ICD-10-CM

## 2024-01-23 PROCEDURE — NC001 PR NO CHARGE: Performed by: PSYCHIATRY & NEUROLOGY

## 2024-01-23 RX ORDER — MELATONIN
2000 DAILY
Qty: 60 TABLET | Refills: 0 | Status: SHIPPED | OUTPATIENT
Start: 2024-01-23 | End: 2024-02-22
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
## 2024-01-24 ENCOUNTER — OFFICE VISIT (OUTPATIENT)
Dept: GASTROENTEROLOGY | Facility: CLINIC | Age: 44
End: 2024-01-24
Payer: COMMERCIAL

## 2024-01-24 VITALS
HEART RATE: 80 BPM | DIASTOLIC BLOOD PRESSURE: 80 MMHG | SYSTOLIC BLOOD PRESSURE: 116 MMHG | TEMPERATURE: 96.9 F | WEIGHT: 157.2 LBS | HEIGHT: 71 IN | BODY MASS INDEX: 22.01 KG/M2

## 2024-01-24 DIAGNOSIS — B18.2 CHRONIC HEPATITIS C WITHOUT HEPATIC COMA (HCC): ICD-10-CM

## 2024-01-24 DIAGNOSIS — R10.32 LEFT LOWER QUADRANT ABDOMINAL PAIN: Primary | ICD-10-CM

## 2024-01-24 DIAGNOSIS — R74.8 ELEVATED LIVER ENZYMES: ICD-10-CM

## 2024-01-24 LAB
HCV RNA SERPL NAA+PROBE-ACNC: ABNORMAL IU/ML
HCV RNA SERPL NAA+PROBE-ACNC: DETECTED K[IU]/ML

## 2024-01-24 PROCEDURE — 99203 OFFICE O/P NEW LOW 30 MIN: CPT | Performed by: FAMILY MEDICINE
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
## 2024-01-24 NOTE — PATIENT INSTRUCTIONS
Ct-scan scheduled on 1/31/24 at [facility].  US scheduled on 2/2/24 at [facility].

## 2024-01-25 ENCOUNTER — TELEPHONE (OUTPATIENT)
Age: 44
End: 2024-01-25

## 2024-01-25 NOTE — TELEPHONE ENCOUNTER
Pt attempted to be reached but not accepting incoming calls and no VM set up unable to r/s appt. Please reach out to pt via [employer] due to pt phone set to block unknown numbers

## 2024-01-29 ENCOUNTER — EVALUATION (OUTPATIENT)
Dept: PHYSICAL THERAPY | Facility: CLINIC | Age: 44
End: 2024-01-29
Payer: COMMERCIAL

## 2024-01-29 ENCOUNTER — APPOINTMENT (OUTPATIENT)
Dept: LAB | Facility: CLINIC | Age: 44
End: 2024-01-29
Payer: COMMERCIAL

## 2024-01-29 DIAGNOSIS — M54.41 CHRONIC BILATERAL LOW BACK PAIN WITH BILATERAL SCIATICA: ICD-10-CM

## 2024-01-29 DIAGNOSIS — M54.42 CHRONIC BILATERAL LOW BACK PAIN WITH BILATERAL SCIATICA: ICD-10-CM

## 2024-01-29 DIAGNOSIS — G89.29 CHRONIC BILATERAL LOW BACK PAIN WITH BILATERAL SCIATICA: ICD-10-CM

## 2024-01-29 DIAGNOSIS — B18.2 CHRONIC HEPATITIS C WITHOUT HEPATIC COMA (HCC): ICD-10-CM

## 2024-01-29 LAB
INR PPP: 0.99 (ref 0.84–1.19)
PROTHROMBIN TIME: 13 SECONDS (ref 11.6–14.5)

## 2024-01-29 PROCEDURE — 97110 THERAPEUTIC EXERCISES: CPT | Performed by: PHYSICAL THERAPIST

## 2024-01-29 PROCEDURE — 86709 HEPATITIS A IGM ANTIBODY: CPT

## 2024-01-29 PROCEDURE — 82247 BILIRUBIN TOTAL: CPT

## 2024-01-29 PROCEDURE — 87902 NFCT AGT GNTYP ALYS HEP C: CPT

## 2024-01-29 PROCEDURE — 87340 HEPATITIS B SURFACE AG IA: CPT

## 2024-01-29 PROCEDURE — 83010 ASSAY OF HAPTOGLOBIN QUANT: CPT

## 2024-01-29 PROCEDURE — 97161 PT EVAL LOW COMPLEX 20 MIN: CPT | Performed by: PHYSICAL THERAPIST

## 2024-01-29 PROCEDURE — 97112 NEUROMUSCULAR REEDUCATION: CPT | Performed by: PHYSICAL THERAPIST

## 2024-01-29 PROCEDURE — 36415 COLL VENOUS BLD VENIPUNCTURE: CPT

## 2024-01-29 PROCEDURE — 82172 ASSAY OF APOLIPOPROTEIN: CPT

## 2024-01-29 PROCEDURE — 86704 HEP B CORE ANTIBODY TOTAL: CPT

## 2024-01-29 PROCEDURE — 82977 ASSAY OF GGT: CPT

## 2024-01-29 PROCEDURE — 83883 ASSAY NEPHELOMETRY NOT SPEC: CPT

## 2024-01-29 PROCEDURE — 86706 HEP B SURFACE ANTIBODY: CPT

## 2024-01-29 PROCEDURE — 84460 ALANINE AMINO (ALT) (SGPT): CPT

## 2024-01-29 PROCEDURE — 85610 PROTHROMBIN TIME: CPT

## 2024-01-29 NOTE — PROGRESS NOTES
PT Evaluation     Today's date: 2024  Patient name: Richy Barraza  : 1980  MRN: [id number]  Referring provider: Grzegorz Montanez MD  Dx:   Encounter Diagnosis     ICD-10-CM    1. Chronic bilateral low back pain with bilateral sciatica  M54.42 Ambulatory referral to PT spine    M54.41     G89.29                      Assessment  Assessment details: Pt is a 43 y.o. year old male presenting to physical therapy for Chronic bilateral low back pain with bilateral sciatica.  He presents via comp spine program and is high risk based on Start Back assessment tool.  He presents with the following impairments:  limited lumbar ROM, limited R hip ROM, b/l LE weakness, (+) R hip and SIJ tests as well as hamstring stiffness b/l affecting his function with walking, standing, transfer, squatting, bending, lifting, and twisting.  Pt will benefit from skilled physical therapy to address functional limitations noted in evaluation and meet patient goals.  Impairments: abnormal or restricted ROM, abnormal movement, activity intolerance, impaired physical strength, lacks appropriate home exercise program, pain with function, poor posture  and poor body mechanics    Symptom irritability: moderate  Goals  ST. Pt will improve pain to 7/10.  2. Pt will improve lumbar flexion to min deficits.    LT. Pt will improve hip IR/ER strength to 4/5 for improved ability to walk long periods.  2. Pt will improve hip flexion to 4+/5 b/l for improved squatting ability.  3. Pt will meet projected FOTO score.    Plan  Patient would benefit from: PT eval  Planned modality interventions: cryotherapy, electrical stimulation/Russian stimulation, thermotherapy: hydrocollator packs and TENS  Planned therapy interventions: joint mobilization, abdominal trunk stabilization, manual therapy, balance, neuromuscular re-education, patient education, strengthening, stretching, therapeutic activities, therapeutic exercise, functional ROM exercises,  flexibility, gait training and home exercise program  Frequency: 2x week  Duration in weeks: 4        Subjective Evaluation    History of Present Illness  Mechanism of injury: Pt reports a bulged disc in his low back for years that flares up frequently as well as neck pain and shoulder pain.  He broke his hip bone over the summer which now affects the way he walks, and he feels pulling in his opposite hip.  He has some pain when standing from a sitting position.  He denies any numbness or tingling in his legs, but does have pain into his left groin and into his privates.  He reports a suspected hernia, but is scheduled for a CT scan this week to investigate his prostate.  He reports b/l shoulder pain with lots of cracking, popping, and pinching.  He takes ibuprofen as needed.          Recurrent probem    Pain  Current pain rating: 10          Objective     Active Range of Motion     Lumbar   Flexion:  with pain Restriction level: moderate  Extension:  with pain Restriction level: moderate  Left lateral flexion:  with pain Restriction level: moderate  Right lateral flexion:  with pain Restriction level: moderate  Left rotation:  Restriction level: minimal  Right rotation:  Restriction level: minimal  Left Hip   Flexion: 120 degrees   External rotation (90/90): 45 degrees   Internal rotation (90/90): 30 degrees     Right Hip   Flexion: 110 degrees   External rotation (90/90): 20 degrees with pain  Internal rotation (90/90): 10 degrees with pain    Strength/Myotome Testing     Lumbar   Left   Heel walk: normal  Toe walk: normal    Right   Heel walk: normal  Toe walk: normal    Left Hip   Planes of Motion   Flexion: 4-  Abduction: 4-  External rotation: 3  Internal rotation: 3-    Right Hip   Planes of Motion   Flexion: 3+  Abduction: 3+  External rotation: 4-  Internal rotation: 3+    Left Knee   Flexion: 4+  Extension: 5    Right Knee   Flexion: 4+  Extension: 5    Additional Strength Details  Poor squatting mechanics  "w good depth    Tests     Lumbar   Positive SIJ compression, sacroiliac distraction  and sacral spring .     Left   Negative passive SLR and slump test.     Right   Negative passive SLR and slump test.     Left Hip   Positive OLIVIA and FADIR.     Right Hip   Positive OLIVIA and FADIR.     Ambulation     Observational Gait   Gait: asymmetric   Decreased walking speed and stride length.              Precautions:     Date 1/29            Visit # 1            FOTO IE             Re-eval IE              Manuals 1/29            R hip PROM             Lumbar gapping SF Gr IV                                      Neuro Re-Ed             Bridge 15x5\"            Clams 15x GTB            Reverse clams 15x            Supine march             SB ham curl                                       Ther Ex             Bike             SLR 15x            S/l hip ABD 15x            Hip ER iso @ wall             Hip IR             Piriformis str             Hamstring str             Slantboard str             Ther Activity             Squats             Lunges                                       Gait Training                                       Modalities                                            "

## 2024-01-30 LAB
HAV IGM SER QL: NORMAL
HBV CORE AB SER QL: NORMAL
HBV SURFACE AB SER-ACNC: <3 MIU/ML
HBV SURFACE AG SER QL: NORMAL

## 2024-01-31 ENCOUNTER — HOSPITAL ENCOUNTER (OUTPATIENT)
Dept: CT IMAGING | Facility: HOSPITAL | Age: 44
Discharge: HOME | End: 2024-01-31
Payer: COMMERCIAL

## 2024-01-31 DIAGNOSIS — R10.32 LEFT LOWER QUADRANT ABDOMINAL PAIN: ICD-10-CM

## 2024-01-31 PROCEDURE — G1004 CDSM NDSC: HCPCS

## 2024-01-31 PROCEDURE — 74177 CT ABD & PELVIS W/CONTRAST: CPT

## 2024-01-31 RX ADMIN — IOHEXOL 100 ML: 350 INJECTION, SOLUTION INTRAVENOUS at 10:03

## 2024-01-31 NOTE — TELEPHONE ENCOUNTER
Procedure(s):  APPENDECTOMY LAPAROSCOPIC. general    <BSHSIANPOST>    Vitals Value Taken Time   BP 97/39 10/3/2019 10:15 PM   Temp 37.4 °C (99.4 °F) 10/3/2019 10:00 PM   Pulse 113 10/3/2019 10:34 PM   Resp 23 10/3/2019 10:34 PM   SpO2 94 % 10/3/2019 10:34 PM   Vitals shown include unvalidated device data. Pt rescheduled his appointment as follows:    Date: 5/30/2024 Status: [facility]   Time: 3:40 PM Length: 20   Visit Type: MYC URO OVS [facility] [[id number]] Copay: $0.00   Provider: Katharina Maher PA-C Department: [facility]

## 2024-02-01 LAB
A2 MACROGLOB SERPL-MCNC: 169 MG/DL (ref 110–276)
ALT SERPL W P-5'-P-CCNC: 138 IU/L (ref 0–55)
APO A-I SERPL-MCNC: 150 MG/DL (ref 101–178)
BILIRUB SERPL-MCNC: 0.3 MG/DL (ref 0–1.2)
COMMENT: ABNORMAL
FIBROSIS SCORING:: ABNORMAL
FIBROSIS STAGE SERPL QL: ABNORMAL
GGT SERPL-CCNC: 79 IU/L (ref 0–65)
HAPTOGLOB SERPL-MCNC: 150 MG/DL (ref 23–355)
HCV GENTYP SERPL NAA+PROBE: 3
HCV PLEASE NOTE: NORMAL
INTERPRETATIONS: ABNORMAL
LIVER FIBR SCORE SERPL CALC.FIBROSURE: 0.13 (ref 0–0.21)
NECROINFLAMM ACTIVITY SCORING:: ABNORMAL
NECROINFLAMMATORY ACT GRADE SERPL QL: ABNORMAL
NECROINFLAMMATORY ACT SCORE SERPL: 0.64 (ref 0–0.17)
REF LAB TEST METHOD: ABNORMAL
SERVICE CMNT-IMP: ABNORMAL

## 2024-02-02 ENCOUNTER — TELEPHONE (OUTPATIENT)
Dept: GASTROENTEROLOGY | Facility: CLINIC | Age: 44
End: 2024-02-02

## 2024-02-02 ENCOUNTER — TRANSCRIBE ORDERS (OUTPATIENT)
Dept: GASTROENTEROLOGY | Facility: CLINIC | Age: 44
End: 2024-02-02

## 2024-02-02 NOTE — TELEPHONE ENCOUNTER
MA-97 form for patient elastography put in outgoing mail. Mailed to:Outpatient                         [address] [employer]                                [address]                           [place] [address]

## 2024-02-05 DIAGNOSIS — B18.2 CHRONIC HEPATITIS C WITHOUT HEPATIC COMA (HCC): Primary | ICD-10-CM

## 2024-02-05 NOTE — TELEPHONE ENCOUNTER
Patients GI provider:  Dr. Maharaj    Number to return call: [phone]    Reason for call: Pt calling stating he reviewed his blood work results on mychart and it states he has severe liver damage according to the numbers pt states. He is very worried about this and would like a call back asap. Routing as high priority.     Scheduled procedure/appointment date if applicable: Apt 5/6

## 2024-02-05 NOTE — TELEPHONE ENCOUNTER
Patient's labs demonstrate mildly elevated transaminases in the setting of a chronic HCV infection (+HCV Ab, +HCV viral load 285K and HCV GT3). He is negative for a coinfection with HIV and hepatitis B. His HCV FibroSure shows little to no liver scarring/stiffness (F0 fibrosis). However, he was also ordered for a US elastography which is a better assessment of hepatic fibrosis and it is strongly recommended that he complete this study at his earliest convenience. Please assist patient with scheduling if necessary.     In regards to his CT scan, this was completed on 1/31/2024 and provider is awaiting official radiology read. Please inform patient that he will be contacted once the results are available.

## 2024-02-06 NOTE — TELEPHONE ENCOUNTER
Incoming call received from access center, pt requesting to review alarming results from bloodwork.  Spoke with pt, reviewed results as per celia denton, all questions and concerns addressed, pt advised to schedule elastography, he is waiting for his insurance to authorize and then he will schedule. Pt agreeable and verbalized understanding of all information

## 2024-02-07 NOTE — TELEPHONE ENCOUNTER
Discussed hepatitis c treatment options and medication approval process. Recommendation for hepatitis a/b vaccine. Patient will schedule US elastography when authorized.     Mavyret 8 week treatment    Treatment  naive  Viral load [id number]  Genotype  3  Fibrosure 0.13   F0    HBV surf ab  < 3.0  VACCINE RECOMMENDED

## 2024-02-09 ENCOUNTER — NURSE TRIAGE (OUTPATIENT)
Age: 44
End: 2024-02-09

## 2024-02-09 NOTE — TELEPHONE ENCOUNTER
Spoke to [facility]. Mavyret is ready to be shipped. Patient will be contacted to schedule delivery.

## 2024-02-09 NOTE — TELEPHONE ENCOUNTER
"Mattie states they received request for Elastography however forms need to be corrected. He can be reached at [phone]     Reason for Disposition   Information only question and nurse able to answer    Answer Assessment - Initial Assessment Questions  1. REASON FOR CALL or QUESTION: \"What is your reason for calling today?\" or \"How can I best help you?\" or \"What question do you have that I can help answer?\"      PA forms need to e corrected    Protocols used: Information Only Call - No Triage-ADULT-[place]    "

## 2024-02-12 NOTE — TELEPHONE ENCOUNTER
Returned phone call received from [facility]. No answer, so I left  in regards to the forms that he states need corrected. Asked for a return call.

## 2024-02-13 NOTE — TELEPHONE ENCOUNTER
Mattie is returning Flaco Schrader's call. Unable to transfer as [place] is having issues. Please return his call at [phone].

## 2024-02-14 NOTE — TELEPHONE ENCOUNTER
Returned Mattie's call. No answer but I was able to leave a voice message asking to return my call.

## 2024-02-15 ENCOUNTER — TELEPHONE (OUTPATIENT)
Dept: FAMILY MEDICINE CLINIC | Facility: CLINIC | Age: 44
End: 2024-02-15

## 2024-02-15 NOTE — TELEPHONE ENCOUNTER
My name is Richy Barraza. I have an appointment at 4:00. I will not be able to make it. My job interview ran a little long. If you please give me a call back at [phone]. Thank you.      Appointment rescheduled.

## 2024-02-16 NOTE — TELEPHONE ENCOUNTER
Patient received Mavyret delivery and started treatment yesterday. Education initiated  -dose, missed dose, side effects and required blood work. Reinforced recommendation for hepatitis a/b vaccine.     4 week labs due 3/14/24

## 2024-02-19 ENCOUNTER — OFFICE VISIT (OUTPATIENT)
Dept: FAMILY MEDICINE CLINIC | Facility: CLINIC | Age: 44
End: 2024-02-19

## 2024-02-19 VITALS
RESPIRATION RATE: 16 BRPM | HEART RATE: 82 BPM | DIASTOLIC BLOOD PRESSURE: 80 MMHG | OXYGEN SATURATION: 99 % | SYSTOLIC BLOOD PRESSURE: 124 MMHG | BODY MASS INDEX: 22.61 KG/M2 | TEMPERATURE: 96.8 F | HEIGHT: 71 IN | WEIGHT: 161.5 LBS

## 2024-02-19 DIAGNOSIS — S72.101S CLOSED FRACTURE OF TROCHANTER OF RIGHT FEMUR, SEQUELA: ICD-10-CM

## 2024-02-19 DIAGNOSIS — G56.02 CARPAL TUNNEL SYNDROME OF LEFT WRIST: ICD-10-CM

## 2024-02-19 DIAGNOSIS — M54.41 CHRONIC BILATERAL LOW BACK PAIN WITH BILATERAL SCIATICA: Primary | ICD-10-CM

## 2024-02-19 DIAGNOSIS — G89.29 CHRONIC BILATERAL LOW BACK PAIN WITH BILATERAL SCIATICA: Primary | ICD-10-CM

## 2024-02-19 DIAGNOSIS — M54.42 CHRONIC BILATERAL LOW BACK PAIN WITH BILATERAL SCIATICA: Primary | ICD-10-CM

## 2024-02-19 PROCEDURE — 99213 OFFICE O/P EST LOW 20 MIN: CPT | Performed by: INTERNAL MEDICINE

## 2024-02-19 RX ORDER — LIDOCAINE 50 MG/G
1 PATCH TOPICAL DAILY
Qty: 21 PATCH | Refills: 0 | Status: SHIPPED | OUTPATIENT
Start: 2024-02-19
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
## 2024-02-23 ENCOUNTER — TELEMEDICINE (OUTPATIENT)
Dept: GASTROENTEROLOGY | Facility: CLINIC | Age: 44
End: 2024-02-23
Payer: COMMERCIAL

## 2024-02-23 DIAGNOSIS — R93.5 ABNORMAL FINDINGS ON DIAGNOSTIC IMAGING OF ABDOMEN: ICD-10-CM

## 2024-02-23 DIAGNOSIS — R10.32 LEFT LOWER QUADRANT PAIN: ICD-10-CM

## 2024-02-23 DIAGNOSIS — R10.13 DYSPEPSIA: Primary | ICD-10-CM

## 2024-02-23 DIAGNOSIS — R13.10 DYSPHAGIA, UNSPECIFIED TYPE: ICD-10-CM

## 2024-02-23 DIAGNOSIS — K59.00 CONSTIPATION, UNSPECIFIED CONSTIPATION TYPE: ICD-10-CM

## 2024-02-23 PROCEDURE — 99213 OFFICE O/P EST LOW 20 MIN: CPT | Performed by: FAMILY MEDICINE
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
## 2024-02-23 NOTE — Clinical Note
Please schedule patient for both his upper endoscopy and colonoscopy. He is requesting [facility]. He will require a 2-day bowel prep due to constipation. Thank you!

## 2024-02-27 ENCOUNTER — APPOINTMENT (OUTPATIENT)
Dept: RADIOLOGY | Age: 44
End: 2024-02-27
Payer: COMMERCIAL

## 2024-02-27 ENCOUNTER — OFFICE VISIT (OUTPATIENT)
Dept: OBGYN CLINIC | Facility: CLINIC | Age: 44
End: 2024-02-27
Payer: COMMERCIAL

## 2024-02-27 VITALS
SYSTOLIC BLOOD PRESSURE: 113 MMHG | HEIGHT: 71 IN | WEIGHT: 161 LBS | HEART RATE: 73 BPM | DIASTOLIC BLOOD PRESSURE: 77 MMHG | BODY MASS INDEX: 22.54 KG/M2

## 2024-02-27 DIAGNOSIS — S72.101S CLOSED FRACTURE OF TROCHANTER OF RIGHT FEMUR, SEQUELA: ICD-10-CM

## 2024-02-27 DIAGNOSIS — M54.16 LUMBAR BACK PAIN WITH RADICULOPATHY AFFECTING LOWER EXTREMITY: Primary | ICD-10-CM

## 2024-02-27 PROBLEM — S72.101A CLOSED FRACTURE OF TROCHANTER OF RIGHT FEMUR (HCC): Status: ACTIVE | Noted: 2024-02-27

## 2024-02-27 PROCEDURE — 73502 X-RAY EXAM HIP UNI 2-3 VIEWS: CPT

## 2024-02-27 PROCEDURE — 99244 OFF/OP CNSLTJ NEW/EST MOD 40: CPT | Performed by: STUDENT IN AN ORGANIZED HEALTH CARE EDUCATION/TRAINING PROGRAM

## 2024-02-27 RX ORDER — METHYLPREDNISOLONE 4 MG/1
TABLET ORAL
Qty: 21 TABLET | Refills: 0 | Status: SHIPPED | OUTPATIENT
Start: 2024-02-27
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
## 2024-02-27 NOTE — ASSESSMENT & PLAN NOTE
-Referral placed to Spine & Pain Management  -Referral for Comprehensive Spine PT  -Medrol dosepak for symptomatic relief  -Tylenol 1000 mg 3x daily   -Ibuprofen as needed for pain control

## 2024-03-01 ENCOUNTER — TELEPHONE (OUTPATIENT)
Dept: GASTROENTEROLOGY | Facility: CLINIC | Age: 44
End: 2024-03-01

## 2024-03-01 NOTE — TELEPHONE ENCOUNTER
----- Message from Mely Almeida PA-C sent at 2/28/2024  9:26 AM EST -----  Please schedule patient for both his upper endoscopy and colonoscopy. He is requesting [facility]. He will require a 2-day bowel prep due to constipation. Thank you!

## 2024-03-01 NOTE — TELEPHONE ENCOUNTER
Scheduled date of EGD/colonoscopy (as of today): 4/10/24  Physician performing EGD/colonoscopy: Dr. Jr Cid  Location of EGD/colonoscopy: [place]  Desired bowel prep reviewed with patient: Golytely 2-day prep - as per pt's request, sent thru [facility]  Instructions reviewed with patient by: Naye  Clearances:  N/A

## 2024-03-01 NOTE — TELEPHONE ENCOUNTER
Spoke to patient, scheduled EGD/Colonoscopy on 4/10/24. Prep instructions - Golytely 2-day prep sent thru [facility] (as per pt's request).

## 2024-03-04 ENCOUNTER — APPOINTMENT (OUTPATIENT)
Dept: LAB | Facility: CLINIC | Age: 44
End: 2024-03-04
Payer: COMMERCIAL

## 2024-03-04 DIAGNOSIS — B18.2 CHRONIC HEPATITIS C WITHOUT HEPATIC COMA (HCC): ICD-10-CM

## 2024-03-04 LAB
ALBUMIN SERPL BCP-MCNC: 4.4 G/DL (ref 3.5–5)
ALP SERPL-CCNC: 74 U/L (ref 34–104)
ALT SERPL W P-5'-P-CCNC: 16 U/L (ref 7–52)
ANION GAP SERPL CALCULATED.3IONS-SCNC: 6 MMOL/L
AST SERPL W P-5'-P-CCNC: 19 U/L (ref 13–39)
BASOPHILS # BLD AUTO: 0.15 THOUSANDS/ÂΜL (ref 0–0.1)
BASOPHILS NFR BLD AUTO: 2 % (ref 0–1)
BILIRUB SERPL-MCNC: 0.52 MG/DL (ref 0.2–1)
BUN SERPL-MCNC: 12 MG/DL (ref 5–25)
CALCIUM SERPL-MCNC: 9.1 MG/DL (ref 8.4–10.2)
CHLORIDE SERPL-SCNC: 102 MMOL/L (ref 96–108)
CO2 SERPL-SCNC: 31 MMOL/L (ref 21–32)
CREAT SERPL-MCNC: 0.96 MG/DL (ref 0.6–1.3)
EOSINOPHIL # BLD AUTO: 0.6 THOUSAND/ÂΜL (ref 0–0.61)
EOSINOPHIL NFR BLD AUTO: 6 % (ref 0–6)
ERYTHROCYTE [DISTWIDTH] IN BLOOD BY AUTOMATED COUNT: 12.1 % (ref 11.6–15.1)
GFR SERPL CREATININE-BSD FRML MDRD: 96 ML/MIN/1.73SQ M
GLUCOSE P FAST SERPL-MCNC: 70 MG/DL (ref 65–99)
HCT VFR BLD AUTO: 43.1 % (ref 36.5–49.3)
HGB BLD-MCNC: 14.8 G/DL (ref 12–17)
IMM GRANULOCYTES # BLD AUTO: 0.02 THOUSAND/UL (ref 0–0.2)
IMM GRANULOCYTES NFR BLD AUTO: 0 % (ref 0–2)
LYMPHOCYTES # BLD AUTO: 3.43 THOUSANDS/ÂΜL (ref 0.6–4.47)
LYMPHOCYTES NFR BLD AUTO: 34 % (ref 14–44)
MCH RBC QN AUTO: 32.7 PG (ref 26.8–34.3)
MCHC RBC AUTO-ENTMCNC: 34.3 G/DL (ref 31.4–37.4)
MCV RBC AUTO: 95 FL (ref 82–98)
MONOCYTES # BLD AUTO: 1.02 THOUSAND/ÂΜL (ref 0.17–1.22)
MONOCYTES NFR BLD AUTO: 10 % (ref 4–12)
NEUTROPHILS # BLD AUTO: 4.93 THOUSANDS/ÂΜL (ref 1.85–7.62)
NEUTS SEG NFR BLD AUTO: 48 % (ref 43–75)
NRBC BLD AUTO-RTO: 0 /100 WBCS
PLATELET # BLD AUTO: 180 THOUSANDS/UL (ref 149–390)
PMV BLD AUTO: 11.3 FL (ref 8.9–12.7)
POTASSIUM SERPL-SCNC: 3.9 MMOL/L (ref 3.5–5.3)
PROT SERPL-MCNC: 6.9 G/DL (ref 6.4–8.4)
RBC # BLD AUTO: 4.52 MILLION/UL (ref 3.88–5.62)
SODIUM SERPL-SCNC: 139 MMOL/L (ref 135–147)
WBC # BLD AUTO: 10.15 THOUSAND/UL (ref 4.31–10.16)

## 2024-03-04 PROCEDURE — 87522 HEPATITIS C REVRS TRNSCRPJ: CPT

## 2024-03-04 PROCEDURE — 86708 HEPATITIS A ANTIBODY: CPT

## 2024-03-04 PROCEDURE — 87521 HEPATITIS C PROBE&RVRS TRNSC: CPT

## 2024-03-04 PROCEDURE — 80053 COMPREHEN METABOLIC PANEL: CPT

## 2024-03-04 PROCEDURE — 85025 COMPLETE CBC W/AUTO DIFF WBC: CPT

## 2024-03-04 PROCEDURE — 36415 COLL VENOUS BLD VENIPUNCTURE: CPT

## 2024-03-05 LAB — HAV AB SER QL IA: NORMAL

## 2024-03-06 LAB — HCV RNA SERPL NAA+PROBE-ACNC: NOT DETECTED K[IU]/ML

## 2024-03-08 ENCOUNTER — TELEPHONE (OUTPATIENT)
Dept: GASTROENTEROLOGY | Facility: CLINIC | Age: 44
End: 2024-03-08

## 2024-03-08 DIAGNOSIS — B18.2 CHRONIC HEPATITIS C WITHOUT HEPATIC COMA (HCC): Primary | ICD-10-CM

## 2024-03-08 NOTE — TELEPHONE ENCOUNTER
Telephone call from patient that the pharmacy told him they can't refill his Mobic prescription due to not accepting his insurance anymore. They told him to reach out to the doctors office regarding this to see what he can do.     Please reach out to the patient at [phone] so he can talk to someone in the office.

## 2024-03-08 NOTE — TELEPHONE ENCOUNTER
Patient updated with new specialty pharmacy contact -[facility] specialty -Luis

## 2024-03-08 NOTE — TELEPHONE ENCOUNTER
Pt called with update. Transferred to [facility] for further assistance.

## 2024-03-08 NOTE — TELEPHONE ENCOUNTER
Spoke to [facility] pharmacy regarding Mavyret refill. [place] is [place] so [facility] is not able to refill Mavyret.     Patient is aware [facility] pharmacy is unable to refill Mavyret due to insurance change.     Spoke to [facility]. Participating pharmacy with new plan -[facility] specialty. [facility] specialty will forward patient information to [facility] specialty -[facility]/ update Mavyret script for remainder of 8 week treatment entered.

## 2024-03-08 NOTE — TELEPHONE ENCOUNTER
Patient states he contacted [employer] to verify insurance as instructed. [facility] and was told they would not accept over ride code for Mavyret.       Spoke to Leanna, [facility] to discuss Mavyret status. Provided over ride code [phone]. Leanna confirmed Mavyret approved and will contact the patient to schedule delivery.

## 2024-03-08 NOTE — TELEPHONE ENCOUNTER
[facility] follow up on [facility]. [facility] is unable to process because it is showing he has another active health plan. Patient needs to call [facility] member services  and verify this is only active plan in order to process script.

## 2024-03-08 NOTE — TELEPHONE ENCOUNTER
Patient confirmed [facility] specialty contacted him and scheduled Mavyret delivery 3/12. He will continue taking Mavyret to complete 8 week tx.    SVR due 7/4/24

## 2024-03-13 ENCOUNTER — CONSULT (OUTPATIENT)
Dept: PAIN MEDICINE | Facility: CLINIC | Age: 44
End: 2024-03-13
Payer: COMMERCIAL

## 2024-03-13 VITALS
WEIGHT: 161 LBS | SYSTOLIC BLOOD PRESSURE: 104 MMHG | DIASTOLIC BLOOD PRESSURE: 68 MMHG | HEIGHT: 71 IN | BODY MASS INDEX: 22.54 KG/M2

## 2024-03-13 DIAGNOSIS — G89.29 CHRONIC BILATERAL LOW BACK PAIN WITHOUT SCIATICA: ICD-10-CM

## 2024-03-13 DIAGNOSIS — M54.50 CHRONIC BILATERAL LOW BACK PAIN WITHOUT SCIATICA: ICD-10-CM

## 2024-03-13 DIAGNOSIS — M54.12 CERVICAL RADICULITIS: Primary | ICD-10-CM

## 2024-03-13 DIAGNOSIS — M47.812 CERVICAL SPONDYLOSIS WITHOUT MYELOPATHY: ICD-10-CM

## 2024-03-13 PROCEDURE — 99244 OFF/OP CNSLTJ NEW/EST MOD 40: CPT | Performed by: ANESTHESIOLOGY

## 2024-03-13 RX ORDER — METHOCARBAMOL 750 MG/1
750 TABLET, FILM COATED ORAL 4 TIMES DAILY PRN
COMMUNITY
Start: 2024-03-07 | End: 2024-03-17
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
## 2024-04-10 ENCOUNTER — ANESTHESIA EVENT (OUTPATIENT)
Dept: GASTROENTEROLOGY | Facility: HOSPITAL | Age: 44
End: 2024-04-10

## 2024-04-10 ENCOUNTER — ANESTHESIA (OUTPATIENT)
Dept: GASTROENTEROLOGY | Facility: HOSPITAL | Age: 44
End: 2024-04-10

## 2024-04-10 ENCOUNTER — HOSPITAL ENCOUNTER (OUTPATIENT)
Dept: GASTROENTEROLOGY | Facility: HOSPITAL | Age: 44
Setting detail: OUTPATIENT SURGERY
Discharge: HOME | End: 2024-04-10
Payer: COMMERCIAL

## 2024-04-10 VITALS
SYSTOLIC BLOOD PRESSURE: 151 MMHG | RESPIRATION RATE: 16 BRPM | WEIGHT: 161 LBS | DIASTOLIC BLOOD PRESSURE: 83 MMHG | HEART RATE: 61 BPM | BODY MASS INDEX: 22.54 KG/M2 | TEMPERATURE: 97.3 F | HEIGHT: 71 IN | OXYGEN SATURATION: 100 %

## 2024-04-10 DIAGNOSIS — R13.10 DYSPHAGIA, UNSPECIFIED TYPE: ICD-10-CM

## 2024-04-10 DIAGNOSIS — R93.5 ABNORMAL FINDINGS ON DIAGNOSTIC IMAGING OF ABDOMEN: ICD-10-CM

## 2024-04-10 DIAGNOSIS — K59.00 CONSTIPATION, UNSPECIFIED CONSTIPATION TYPE: ICD-10-CM

## 2024-04-10 DIAGNOSIS — R10.13 DYSPEPSIA: ICD-10-CM

## 2024-04-10 DIAGNOSIS — R10.32 LEFT LOWER QUADRANT PAIN: ICD-10-CM

## 2024-04-10 PROCEDURE — 88305 TISSUE EXAM BY PATHOLOGIST: CPT | Performed by: PATHOLOGY

## 2024-04-10 PROCEDURE — 43450 DILATE ESOPHAGUS 1/MULT PASS: CPT | Performed by: INTERNAL MEDICINE

## 2024-04-10 PROCEDURE — 43239 EGD BIOPSY SINGLE/MULTIPLE: CPT | Performed by: INTERNAL MEDICINE

## 2024-04-10 PROCEDURE — 45378 DIAGNOSTIC COLONOSCOPY: CPT | Performed by: INTERNAL MEDICINE

## 2024-04-10 RX ORDER — PROPOFOL 10 MG/ML
INJECTION, EMULSION INTRAVENOUS AS NEEDED
Status: DISCONTINUED | OUTPATIENT
Start: 2024-04-10 | End: 2024-04-10

## 2024-04-10 RX ORDER — SODIUM CHLORIDE, SODIUM LACTATE, POTASSIUM CHLORIDE, CALCIUM CHLORIDE 600; 310; 30; 20 MG/100ML; MG/100ML; MG/100ML; MG/100ML
INJECTION, SOLUTION INTRAVENOUS CONTINUOUS PRN
Status: DISCONTINUED | OUTPATIENT
Start: 2024-04-10 | End: 2024-04-10

## 2024-04-10 RX ORDER — EPHEDRINE SULFATE 50 MG/ML
INJECTION INTRAVENOUS AS NEEDED
Status: DISCONTINUED | OUTPATIENT
Start: 2024-04-10 | End: 2024-04-10

## 2024-04-10 RX ORDER — LIDOCAINE HYDROCHLORIDE 10 MG/ML
INJECTION, SOLUTION EPIDURAL; INFILTRATION; INTRACAUDAL; PERINEURAL AS NEEDED
Status: DISCONTINUED | OUTPATIENT
Start: 2024-04-10 | End: 2024-04-10

## 2024-04-10 RX ADMIN — PROPOFOL 50 MG: 10 INJECTION, EMULSION INTRAVENOUS at 10:55

## 2024-04-10 RX ADMIN — PROPOFOL 150 MCG/KG/MIN: 10 INJECTION, EMULSION INTRAVENOUS at 10:53

## 2024-04-10 RX ADMIN — Medication 40 MG: at 11:19

## 2024-04-10 RX ADMIN — LIDOCAINE HYDROCHLORIDE 50 MG: 10 INJECTION, SOLUTION EPIDURAL; INFILTRATION; INTRACAUDAL; PERINEURAL at 10:52

## 2024-04-10 RX ADMIN — SODIUM CHLORIDE, SODIUM LACTATE, POTASSIUM CHLORIDE, AND CALCIUM CHLORIDE: .6; .31; .03; .02 INJECTION, SOLUTION INTRAVENOUS at 10:47

## 2024-04-10 RX ADMIN — PROPOFOL 150 MG: 10 INJECTION, EMULSION INTRAVENOUS at 10:52

## 2024-04-10 RX ADMIN — EPHEDRINE SULFATE 10 MG: 50 INJECTION INTRAVENOUS at 11:11

## 2024-04-10 NOTE — ANESTHESIA POSTPROCEDURE EVALUATION
Post-Op Assessment Note    CV Status:  Stable    Pain management: adequate       Mental Status:  Alert and awake   Hydration Status:  Euvolemic   PONV Controlled:  Controlled   Airway Patency:  Patent     Post Op Vitals Reviewed: Yes      Staff: CRNA               /59 (04/10/24 1133)    Temp (!) 97.3 °F (36.3 °C) (04/10/24 1133)    Pulse 98 (04/10/24 1133)   Resp 18 (04/10/24 1133)    SpO2 99 % (04/10/24 1133)
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
## 2024-04-10 NOTE — ANESTHESIA PREPROCEDURE EVALUATION
Procedure:  COLONOSCOPY  EGD    Relevant Problems   GI/HEPATIC   (+) Chronic hepatitis C without hepatic coma (HCC)      MUSCULOSKELETAL   (+) Lumbar back pain with radiculopathy affecting lower extremity      NEURO/PSYCH   (+) FERMIN (generalized anxiety disorder)   (+) Severe episode of recurrent major depressive disorder, without psychotic features (HCC)      Behavioral Health   (+) Alcohol use disorder, severe, in early remission (HCC)      Other   (+) Severe protein-calorie malnutrition (HCC)        Physical Exam    Airway    Mallampati score: I  TM Distance: >3 FB  Neck ROM: full     Dental       Cardiovascular  Cardiovascular exam normal    Pulmonary  Pulmonary exam normal     Other Findings        Anesthesia Plan  ASA Score- 2     Anesthesia Type- IV sedation with anesthesia with ASA Monitors.         Additional Monitors:     Airway Plan:            Plan Factors-Exercise tolerance (METS): >4 METS.    Chart reviewed. EKG reviewed. Imaging results reviewed. Existing labs reviewed. Patient summary reviewed.                  Induction- intravenous.    Postoperative Plan- Plan for postoperative opioid use. Planned trial extubation    Informed Consent- Anesthetic plan and risks discussed with patient.  I personally reviewed this patient with the CRNA. Discussed and agreed on the Anesthesia Plan with the CRNA..

## 2024-04-15 ENCOUNTER — TELEPHONE (OUTPATIENT)
Dept: GASTROENTEROLOGY | Facility: CLINIC | Age: 44
End: 2024-04-15

## 2024-04-15 NOTE — TELEPHONE ENCOUNTER
----- Message from Julieta Mtz DO sent at 4/12/2024 10:32 AM EDT -----  Results of biopsies from recent endoscopy reviewed. Esophageal, stomach, and duodenal biopsies benign. Message sent to patient via [employer]. Thank you.

## 2024-04-15 NOTE — TELEPHONE ENCOUNTER
I was able to speak with pt and have related results from provider, pt has no questions at this time, will reach out through [facility] or will give office a callback. Pt has verbalized and understood results given.

## 2024-04-18 ENCOUNTER — TELEPHONE (OUTPATIENT)
Age: 44
End: 2024-04-18

## 2024-04-18 ENCOUNTER — TELEPHONE (OUTPATIENT)
Dept: GASTROENTEROLOGY | Facility: CLINIC | Age: 44
End: 2024-04-18

## 2024-04-18 ENCOUNTER — OFFICE VISIT (OUTPATIENT)
Dept: FAMILY MEDICINE CLINIC | Facility: CLINIC | Age: 44
End: 2024-04-18

## 2024-04-18 VITALS
HEIGHT: 71 IN | WEIGHT: 157 LBS | SYSTOLIC BLOOD PRESSURE: 127 MMHG | RESPIRATION RATE: 16 BRPM | HEART RATE: 100 BPM | DIASTOLIC BLOOD PRESSURE: 85 MMHG | TEMPERATURE: 98 F | BODY MASS INDEX: 21.98 KG/M2 | OXYGEN SATURATION: 98 %

## 2024-04-18 DIAGNOSIS — M54.41 CHRONIC BILATERAL LOW BACK PAIN WITH BILATERAL SCIATICA: ICD-10-CM

## 2024-04-18 DIAGNOSIS — G89.29 CHRONIC BILATERAL LOW BACK PAIN WITH BILATERAL SCIATICA: ICD-10-CM

## 2024-04-18 DIAGNOSIS — M54.42 CHRONIC BILATERAL LOW BACK PAIN WITH BILATERAL SCIATICA: ICD-10-CM

## 2024-04-18 DIAGNOSIS — M54.16 LUMBAR BACK PAIN WITH RADICULOPATHY AFFECTING LOWER EXTREMITY: Primary | ICD-10-CM

## 2024-04-18 PROCEDURE — 99213 OFFICE O/P EST LOW 20 MIN: CPT | Performed by: FAMILY MEDICINE

## 2024-04-18 RX ORDER — OMEPRAZOLE 20 MG/1
20 CAPSULE, DELAYED RELEASE ORAL DAILY
Qty: 30 CAPSULE | Refills: 3 | Status: SHIPPED | OUTPATIENT
Start: 2024-04-18

## 2024-04-18 RX ORDER — METHOCARBAMOL 500 MG/1
500 TABLET, FILM COATED ORAL 4 TIMES DAILY
Qty: 14 TABLET | Refills: 0 | Status: SHIPPED | OUTPATIENT
Start: 2024-04-18

## 2024-04-18 NOTE — TELEPHONE ENCOUNTER
Pt. Called with questions regarding blood work, warm transfer with [facility] for further assistance
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
## 2024-06-04 ENCOUNTER — NURSE TRIAGE (OUTPATIENT)
Age: 44
End: 2024-06-04

## 2024-06-04 NOTE — TELEPHONE ENCOUNTER
"[facility] calling in, they have been trying to track pt to find out if he completed mavyret and if he did the hep c labs however the number they have on file for pt is disconnected.     I verified with Pharmacy that number is the same we have on file as well and pt did not have his labs completed and OV was a no show. He understood and will clear pt from their que. No further questions.       Reason for Disposition   Information only question and nurse able to answer    Answer Assessment - Initial Assessment Questions  1. REASON FOR CALL or QUESTION: \"What is your reason for calling today?\" or \"How can I best help you?\" or \"What question do you have that I can help answer?\"      See notes    Protocols used: Information Only Call - No Triage-ADULT-[place]    "

## 2024-08-21 ENCOUNTER — TELEPHONE (OUTPATIENT)
Age: 44
End: 2024-08-21

## 2024-08-21 NOTE — TELEPHONE ENCOUNTER
Patients GI provider:  WHITNEY Almeida    Number to return call: [phone]    Reason for call: Delfina from [facility] calling to inquire if pt has been keeping on top of f/u lab work and apts for hep c as she has been unable to reach him. Advised Delfina pt last had labwork in March, has not sched a f/u. Advised [facility] pharmacy also contacted us in Jun as they too were having trouble getting in contact w/pt. Delfina confirmed understanding.    Scheduled procedure/appointment date if applicable: N/A

## 2025-01-18 ENCOUNTER — OFFICE VISIT (OUTPATIENT)
Dept: URGENT CARE | Age: 45
End: 2025-01-18
Payer: COMMERCIAL

## 2025-01-18 ENCOUNTER — HOSPITAL ENCOUNTER (EMERGENCY)
Facility: HOSPITAL | Age: 45
Discharge: HOME | End: 2025-01-18
Attending: EMERGENCY MEDICINE
Payer: COMMERCIAL

## 2025-01-18 VITALS
DIASTOLIC BLOOD PRESSURE: 82 MMHG | TEMPERATURE: 98.8 F | SYSTOLIC BLOOD PRESSURE: 118 MMHG | RESPIRATION RATE: 18 BRPM | HEART RATE: 90 BPM | OXYGEN SATURATION: 97 %

## 2025-01-18 VITALS
OXYGEN SATURATION: 98 % | TEMPERATURE: 98.2 F | SYSTOLIC BLOOD PRESSURE: 152 MMHG | HEART RATE: 90 BPM | BODY MASS INDEX: 22.04 KG/M2 | WEIGHT: 158 LBS | RESPIRATION RATE: 17 BRPM | DIASTOLIC BLOOD PRESSURE: 104 MMHG

## 2025-01-18 DIAGNOSIS — M79.5 FOREIGN BODY (FB) IN SOFT TISSUE: Primary | ICD-10-CM

## 2025-01-18 DIAGNOSIS — S60.351A FOREIGN BODY OF RIGHT THUMB, INITIAL ENCOUNTER: Primary | ICD-10-CM

## 2025-01-18 DIAGNOSIS — T14.8XXA SPLINTER: ICD-10-CM

## 2025-01-18 PROCEDURE — 99213 OFFICE O/P EST LOW 20 MIN: CPT | Performed by: EMERGENCY MEDICINE

## 2025-01-18 PROCEDURE — 99282 EMERGENCY DEPT VISIT SF MDM: CPT

## 2025-01-18 PROCEDURE — 90715 TDAP VACCINE 7 YRS/> IM: CPT | Performed by: EMERGENCY MEDICINE

## 2025-01-18 PROCEDURE — 99284 EMERGENCY DEPT VISIT MOD MDM: CPT | Performed by: EMERGENCY MEDICINE

## 2025-01-18 PROCEDURE — 64450 NJX AA&/STRD OTHER PN/BRANCH: CPT | Performed by: EMERGENCY MEDICINE

## 2025-01-18 RX ORDER — LIDOCAINE HYDROCHLORIDE 10 MG/ML
10 INJECTION, SOLUTION EPIDURAL; INFILTRATION; INTRACAUDAL; PERINEURAL ONCE
Status: COMPLETED | OUTPATIENT
Start: 2025-01-18 | End: 2025-01-18

## 2025-01-18 RX ORDER — BACITRACIN, NEOMYCIN, POLYMYXIN B 400; 3.5; 5 [USP'U]/G; MG/G; [USP'U]/G
1 OINTMENT TOPICAL ONCE
Status: COMPLETED | OUTPATIENT
Start: 2025-01-18 | End: 2025-01-18

## 2025-01-18 RX ADMIN — LIDOCAINE HYDROCHLORIDE 10 ML: 10 SOLUTION INTRAVENOUS at 16:50

## 2025-01-18 RX ADMIN — TETANUS TOXOID, REDUCED DIPHTHERIA TOXOID AND ACELLULAR PERTUSSIS VACCINE, ADSORBED 0.5 ML: 5; 2.5; 8; 8; 2.5 SUSPENSION INTRAMUSCULAR at 17:00

## 2025-01-18 RX ADMIN — BACITRACIN ZINC, NEOMYCIN, POLYMYXIN B SULFAT 1 SMALL APPLICATION: 5000; 3.5; 4 OINTMENT TOPICAL at 16:51
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
## 2025-01-18 NOTE — PATIENT INSTRUCTIONS
Use topical antibiotic as directed.  Recommend warm soaks to the area.  PCP follow-up in 2-3 days.  Proceed to the ER if symptoms worsen.

## 2025-01-20 ENCOUNTER — APPOINTMENT (EMERGENCY)
Dept: RADIOLOGY | Facility: HOSPITAL | Age: 45
End: 2025-01-20
Payer: COMMERCIAL

## 2025-01-20 ENCOUNTER — HOSPITAL ENCOUNTER (EMERGENCY)
Facility: HOSPITAL | Age: 45
Discharge: HOME | End: 2025-01-20
Attending: EMERGENCY MEDICINE
Payer: COMMERCIAL

## 2025-01-20 VITALS
RESPIRATION RATE: 16 BRPM | WEIGHT: 145.06 LBS | BODY MASS INDEX: 20.23 KG/M2 | DIASTOLIC BLOOD PRESSURE: 72 MMHG | HEART RATE: 98 BPM | OXYGEN SATURATION: 97 % | TEMPERATURE: 97.7 F | SYSTOLIC BLOOD PRESSURE: 136 MMHG

## 2025-01-20 DIAGNOSIS — L03.011 FELON OF FINGER OF RIGHT HAND: Primary | ICD-10-CM

## 2025-01-20 PROCEDURE — 99284 EMERGENCY DEPT VISIT MOD MDM: CPT | Performed by: EMERGENCY MEDICINE

## 2025-01-20 PROCEDURE — 10160 PNXR ASPIR ABSC HMTMA BULLA: CPT | Performed by: EMERGENCY MEDICINE

## 2025-01-20 PROCEDURE — 73140 X-RAY EXAM OF FINGER(S): CPT

## 2025-01-20 PROCEDURE — 99284 EMERGENCY DEPT VISIT MOD MDM: CPT

## 2025-01-20 RX ORDER — SULFAMETHOXAZOLE AND TRIMETHOPRIM 800; 160 MG/1; MG/1
1 TABLET ORAL 2 TIMES DAILY
Qty: 14 TABLET | Refills: 0 | Status: SHIPPED | OUTPATIENT
Start: 2025-01-20 | End: 2025-01-20

## 2025-01-20 RX ORDER — OXYCODONE AND ACETAMINOPHEN 5; 325 MG/1; MG/1
1 TABLET ORAL ONCE
Refills: 0 | Status: COMPLETED | OUTPATIENT
Start: 2025-01-20 | End: 2025-01-20

## 2025-01-20 RX ORDER — SULFAMETHOXAZOLE AND TRIMETHOPRIM 800; 160 MG/1; MG/1
1 TABLET ORAL 2 TIMES DAILY
Qty: 14 TABLET | Refills: 0 | Status: SHIPPED | OUTPATIENT
Start: 2025-01-20 | End: 2025-01-27

## 2025-01-20 RX ORDER — LIDOCAINE HYDROCHLORIDE 10 MG/ML
5 INJECTION, SOLUTION EPIDURAL; INFILTRATION; INTRACAUDAL; PERINEURAL ONCE
Status: COMPLETED | OUTPATIENT
Start: 2025-01-20 | End: 2025-01-20

## 2025-01-20 RX ORDER — SULFAMETHOXAZOLE AND TRIMETHOPRIM 800; 160 MG/1; MG/1
1 TABLET ORAL ONCE
Status: COMPLETED | OUTPATIENT
Start: 2025-01-20 | End: 2025-01-20

## 2025-01-20 RX ADMIN — SULFAMETHOXAZOLE AND TRIMETHOPRIM 1 TABLET: 800; 160 TABLET ORAL at 19:10

## 2025-01-20 RX ADMIN — LIDOCAINE HYDROCHLORIDE 5 ML: 10 INJECTION, SOLUTION EPIDURAL; INFILTRATION; INTRACAUDAL at 19:10

## 2025-01-20 RX ADMIN — OXYCODONE HYDROCHLORIDE AND ACETAMINOPHEN 1 TABLET: 5; 325 TABLET ORAL at 19:10

## 2025-01-20 NOTE — Clinical Note
Richy Barraza was seen and treated in our emergency department on 1/20/2025.                Diagnosis:     Richy  may return to work on return date.    He may return on this date: 01/22/2025         If you have any questions or concerns, please don't hesitate to call.      Julia Maharaj MD    ______________________________           _______________          _______________  Hospital Representative                              Date                                Time
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
## 2025-02-14 NOTE — PROGRESS NOTES
02/09/23 1300   Activity/Group Checklist   Group   (recovery group)   Attendance Attended   Attendance Duration (min) 46-60   Interactions Interacted appropriately   Affect/Mood Appropriate   Goals Achieved Discussed coping strategies; Discussed self-esteem issues; Able to listen to others; Able to engage in interactions; Able to reflect/comment on own behavior;Able to self-disclose; Able to recieve feedback; Able to give feedback to another RT Inhaler-Nebulizer Bronchodilator Protocol Note    There is a bronchodilator order in the chart from a provider indicating to follow the RT Bronchodilator Protocol and there is an “Initiate RT Inhaler-Nebulizer Bronchodilator Protocol” order as well (see protocol at bottom of note).    CXR Findings:  XR CHEST PORTABLE    Result Date: 2/14/2025  Similar-appearing multifocal airspace opacities. Small left pleural effusion.       The findings from the last RT Protocol Assessment were as follows:   History Pulmonary Disease: (P) Chronic pulmonary disease  Respiratory Pattern: (P) Dyspnea on exertion or RR 21-25 bpm  Breath Sounds: (P) Slightly diminished and/or crackles  Cough: (P) Strong, spontaneous, non-productive  Indication for Bronchodilator Therapy: (P) Decreased or absent breath sounds  Bronchodilator Assessment Score: (P) 6    Aerosolized bronchodilator medication orders have been revised according to the RT Inhaler-Nebulizer Bronchodilator Protocol below.    Respiratory Therapist to perform RT Therapy Protocol Assessment initially then follow the protocol.  Repeat RT Therapy Protocol Assessment PRN for score 0-3 or on second treatment, BID, and PRN for scores above 3.    No Indications - adjust the frequency to every 6 hours PRN wheezing or bronchospasm, if no treatments needed after 48 hours then discontinue using Per Protocol order mode.     If indication present, adjust the RT bronchodilator orders based on the Bronchodilator Assessment Score as indicated below.  Use Inhaler orders unless patient has one or more of the following: on home nebulizer, not able to hold breath for 10 seconds, is not alert and oriented, cannot activate and use MDI correctly, or respiratory rate 25 breaths per minute or more, then use the equivalent nebulizer order(s) with same Frequency and PRN reasons based on the score.  If a patient is on this medication at home then do not decrease Frequency below that used at home.    0-3  - enter or revise RT bronchodilator order(s) to equivalent RT Bronchodilator order with Frequency of every 4 hours PRN for wheezing or increased work of breathing using Per Protocol order mode.        4-6 - enter or revise RT Bronchodilator order(s) to two equivalent RT bronchodilator orders with one order with BID Frequency and one order with Frequency of every 4 hours PRN wheezing or increased work of breathing using Per Protocol order mode.        7-10 - enter or revise RT Bronchodilator order(s) to two equivalent RT bronchodilator orders with one order with TID Frequency and one order with Frequency of every 4 hours PRN wheezing or increased work of breathing using Per Protocol order mode.       11-13 - enter or revise RT Bronchodilator order(s) to one equivalent RT bronchodilator order with QID Frequency and an Albuterol order with Frequency of every 4 hours PRN wheezing or increased work of breathing using Per Protocol order mode.      Greater than 13 - enter or revise RT Bronchodilator order(s) to one equivalent RT bronchodilator order with every 4 hours Frequency and an Albuterol order with Frequency of every 2 hours PRN wheezing or increased work of breathing using Per Protocol order mode.         Electronically signed by Casey Hoffman RCP on 2/14/2025 at 7:17 AM

## 2025-02-23 ENCOUNTER — APPOINTMENT (EMERGENCY)
Dept: CT IMAGING | Facility: HOSPITAL | Age: 45
End: 2025-02-23
Payer: COMMERCIAL

## 2025-02-23 ENCOUNTER — HOSPITAL ENCOUNTER (EMERGENCY)
Facility: HOSPITAL | Age: 45
Discharge: HOME | End: 2025-02-24
Attending: EMERGENCY MEDICINE
Payer: COMMERCIAL

## 2025-02-23 VITALS
HEART RATE: 82 BPM | OXYGEN SATURATION: 97 % | SYSTOLIC BLOOD PRESSURE: 127 MMHG | TEMPERATURE: 97.7 F | HEIGHT: 71 IN | DIASTOLIC BLOOD PRESSURE: 70 MMHG | RESPIRATION RATE: 16 BRPM | BODY MASS INDEX: 20.23 KG/M2

## 2025-02-23 DIAGNOSIS — M25.511 ACUTE PAIN OF RIGHT SHOULDER: ICD-10-CM
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
## 2025-02-23 DIAGNOSIS — S09.90XA INJURY OF HEAD, INITIAL ENCOUNTER: ICD-10-CM

## 2025-02-23 DIAGNOSIS — M25.551 PAIN OF RIGHT HIP: ICD-10-CM

## 2025-02-23 LAB
ANION GAP SERPL CALCULATED.3IONS-SCNC: 6 MMOL/L (ref 4–13)
BASOPHILS # BLD AUTO: 0.11 THOUSANDS/ÂΜL (ref 0–0.1)
BASOPHILS NFR BLD AUTO: 1 % (ref 0–1)
BUN SERPL-MCNC: 8 MG/DL (ref 5–25)
CALCIUM SERPL-MCNC: 8.5 MG/DL (ref 8.4–10.2)
CHLORIDE SERPL-SCNC: 104 MMOL/L (ref 96–108)
CO2 SERPL-SCNC: 28 MMOL/L (ref 21–32)
CREAT SERPL-MCNC: 0.73 MG/DL (ref 0.6–1.3)
EOSINOPHIL # BLD AUTO: 0.22 THOUSAND/ÂΜL (ref 0–0.61)
EOSINOPHIL NFR BLD AUTO: 3 % (ref 0–6)
ERYTHROCYTE [DISTWIDTH] IN BLOOD BY AUTOMATED COUNT: 13.2 % (ref 11.6–15.1)
GFR SERPL CREATININE-BSD FRML MDRD: 112 ML/MIN/1.73SQ M
GLUCOSE SERPL-MCNC: 89 MG/DL (ref 65–140)
HCT VFR BLD AUTO: 41.7 % (ref 36.5–49.3)
HGB BLD-MCNC: 14.2 G/DL (ref 12–17)
IMM GRANULOCYTES # BLD AUTO: 0.03 THOUSAND/UL (ref 0–0.2)
IMM GRANULOCYTES NFR BLD AUTO: 0 % (ref 0–2)
LYMPHOCYTES # BLD AUTO: 2.42 THOUSANDS/ÂΜL (ref 0.6–4.47)
LYMPHOCYTES NFR BLD AUTO: 31 % (ref 14–44)
MCH RBC QN AUTO: 33.6 PG (ref 26.8–34.3)
MCHC RBC AUTO-ENTMCNC: 34.1 G/DL (ref 31.4–37.4)
MCV RBC AUTO: 99 FL (ref 82–98)
MONOCYTES # BLD AUTO: 0.61 THOUSAND/ÂΜL (ref 0.17–1.22)
MONOCYTES NFR BLD AUTO: 8 % (ref 4–12)
NEUTROPHILS # BLD AUTO: 4.37 THOUSANDS/ÂΜL (ref 1.85–7.62)
NEUTS SEG NFR BLD AUTO: 57 % (ref 43–75)
NRBC BLD AUTO-RTO: 0 /100 WBCS
PLATELET # BLD AUTO: 178 THOUSANDS/UL (ref 149–390)
PMV BLD AUTO: 9.6 FL (ref 8.9–12.7)
POTASSIUM SERPL-SCNC: 4.2 MMOL/L (ref 3.5–5.3)
RBC # BLD AUTO: 4.22 MILLION/UL (ref 3.88–5.62)
SODIUM SERPL-SCNC: 138 MMOL/L (ref 135–147)
WBC # BLD AUTO: 7.76 THOUSAND/UL (ref 4.31–10.16)

## 2025-02-23 PROCEDURE — 96365 THER/PROPH/DIAG IV INF INIT: CPT

## 2025-02-23 PROCEDURE — 80048 BASIC METABOLIC PNL TOTAL CA: CPT | Performed by: PHYSICIAN ASSISTANT

## 2025-02-23 PROCEDURE — 36415 COLL VENOUS BLD VENIPUNCTURE: CPT | Performed by: PHYSICIAN ASSISTANT

## 2025-02-23 PROCEDURE — 72125 CT NECK SPINE W/O DYE: CPT

## 2025-02-23 PROCEDURE — 70450 CT HEAD/BRAIN W/O DYE: CPT

## 2025-02-23 PROCEDURE — 71260 CT THORAX DX C+: CPT

## 2025-02-23 PROCEDURE — 96375 TX/PRO/DX INJ NEW DRUG ADDON: CPT

## 2025-02-23 PROCEDURE — 74177 CT ABD & PELVIS W/CONTRAST: CPT

## 2025-02-23 PROCEDURE — 99285 EMERGENCY DEPT VISIT HI MDM: CPT | Performed by: PHYSICIAN ASSISTANT

## 2025-02-23 PROCEDURE — 85025 COMPLETE CBC W/AUTO DIFF WBC: CPT | Performed by: PHYSICIAN ASSISTANT

## 2025-02-23 PROCEDURE — 99284 EMERGENCY DEPT VISIT MOD MDM: CPT

## 2025-02-23 RX ORDER — MORPHINE SULFATE 4 MG/ML
4 INJECTION, SOLUTION INTRAMUSCULAR; INTRAVENOUS ONCE
Status: COMPLETED | OUTPATIENT
Start: 2025-02-23 | End: 2025-02-23

## 2025-02-23 RX ORDER — METHOCARBAMOL 750 MG/1
750 TABLET, FILM COATED ORAL ONCE
Status: COMPLETED | OUTPATIENT
Start: 2025-02-23 | End: 2025-02-23

## 2025-02-23 RX ORDER — ACETAMINOPHEN 10 MG/ML
1000 INJECTION, SOLUTION INTRAVENOUS ONCE
Status: COMPLETED | OUTPATIENT
Start: 2025-02-23 | End: 2025-02-23

## 2025-02-23 RX ADMIN — ACETAMINOPHEN 1000 MG: 10 INJECTION INTRAVENOUS at 21:15

## 2025-02-23 RX ADMIN — IOHEXOL 100 ML: 350 INJECTION, SOLUTION INTRAVENOUS at 22:31

## 2025-02-23 RX ADMIN — METHOCARBAMOL 750 MG: 750 TABLET ORAL at 21:16

## 2025-02-23 RX ADMIN — MORPHINE SULFATE 4 MG: 4 INJECTION INTRAVENOUS at 23:09

## 2025-02-24 ENCOUNTER — TELEPHONE (OUTPATIENT)
Age: 45
End: 2025-02-24

## 2025-02-24 RX ORDER — ACETAMINOPHEN 500 MG
500 TABLET ORAL EVERY 6 HOURS PRN
Qty: 30 TABLET | Refills: 0 | Status: SHIPPED | OUTPATIENT
Start: 2025-02-24

## 2025-02-24 RX ORDER — METHOCARBAMOL 750 MG/1
750 TABLET, FILM COATED ORAL 3 TIMES DAILY PRN
Qty: 30 TABLET | Refills: 0 | Status: SHIPPED | OUTPATIENT
Start: 2025-02-24

## 2025-02-24 RX ORDER — LIDOCAINE 50 MG/G
1 PATCH TOPICAL DAILY
Qty: 5 PATCH | Refills: 0 | Status: SHIPPED | OUTPATIENT
Start: 2025-02-24
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
## 2025-02-24 NOTE — TELEPHONE ENCOUNTER
Patient called to schedule with Orthopedics, but call was disconnected and patient stated he would call the office back.

## 2025-02-28 ENCOUNTER — OFFICE VISIT (OUTPATIENT)
Dept: OBGYN CLINIC | Facility: MEDICAL CENTER | Age: 45
End: 2025-02-28
Payer: COMMERCIAL

## 2025-02-28 ENCOUNTER — APPOINTMENT (OUTPATIENT)
Dept: RADIOLOGY | Facility: MEDICAL CENTER | Age: 45
End: 2025-02-28
Payer: COMMERCIAL

## 2025-02-28 VITALS — HEIGHT: 71 IN | BODY MASS INDEX: 20.3 KG/M2 | WEIGHT: 145 LBS

## 2025-02-28 DIAGNOSIS — S70.01XA CONTUSION OF RIGHT HIP, INITIAL ENCOUNTER: Primary | ICD-10-CM

## 2025-02-28 DIAGNOSIS — M25.511 ACUTE PAIN OF RIGHT SHOULDER: ICD-10-CM
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
## 2025-02-28 DIAGNOSIS — M54.2 NECK PAIN: ICD-10-CM

## 2025-02-28 DIAGNOSIS — M25.551 PAIN IN RIGHT HIP: ICD-10-CM

## 2025-02-28 PROCEDURE — 73030 X-RAY EXAM OF SHOULDER: CPT

## 2025-02-28 PROCEDURE — 73502 X-RAY EXAM HIP UNI 2-3 VIEWS: CPT

## 2025-02-28 PROCEDURE — 99204 OFFICE O/P NEW MOD 45 MIN: CPT | Performed by: EMERGENCY MEDICINE

## 2025-02-28 NOTE — PATIENT INSTRUCTIONS
Mane Ms. Grewal,     some of the clots. You were started on a blood thinner called Eliquis, which helps prevent new clots from forming.     It was our pleasure taking care of you! Please follow up with your primary care provider and your additional scheduled appointments as listed below. Please continue taking all your home medications with the changes listed below.     New Medications:   - Eliquis 10 mg through 1/12  - Start Eliquis 5 mg 1/18  - Increase lisinopril from 20 mg daily to 40 mg daily   - Discontinue home Dyazide 37.5-25 mg    Follow Up:   - Vascular Medicine with Dr. Buenrostro April 3rd    You may use Advil (ibuprofen) 400-600mg every 6 hours or at least twice per day (OR Aleve (naproxen) 250-500mg every 12 hours as needed for pain and inflammation).  However do not mix or take other NSAIDs together such as Advil, Motrin, ibuprofen, Celebrex, naproxen, diclofenac or Aleve.    You may also take Tylenol (acetaminophen) together with Advil (ibuprofen) or Aleve (naproxen) as this is safe and can help decrease your pain levels.  The dosing for Tylenol is 500mg every 4-6 hours as needed OR max 1,000mg per dose up to 3 times per day for a total of 3,000mg per day  *Check with your primary care physician to see if these medications are safe to take and to make sure they do not interfere with your other medications and medical issues.          Rotator Cuff Exercises     About this topic   A rotator cuff tear is a problem that can limit how much you can move your shoulder. It can also be painful. The rotator cuff is a group of muscles and tendons in your shoulder. It helps your shoulder move and be steady. These muscles help to rotate and lift the arm. Tendons are strong bands that connect muscles to bones. You can tear a tendon in your shoulder if you fall or move your shoulder too fast and with too much force. Your tendon can also wear out over time and tear. Large tears may require surgery. For small tears, exercises may help make this problem better.  General   Before starting with a program, ask your doctor if you are healthy enough to do these exercises. Your doctor may have you work with a  or physical therapist to make a safe exercise program to meet your needs. You should not do the exercises if they cause sharp pains. You may need to start out with easy exercises at first. Then, once your shoulder is feeling better, you may start the harder exercises.  Passive Exercises:   You use the movement of your body to move your arm. Do NOT use your shoulder muscles to move your arm.  Pendulum exercises  ? Hold onto a table with one hand and bend forward at the waist until your back is level with the table. Let your sore arm hang in front of you. Do each exercise for 1 minute.  Circles ? Move your body in large circles one way. After you move a few times, your arm should start gently moving in circles. Now, move your body in circles the other way until your arm moves the other way.  Swinging side-to-side ? Move your body side to side. After you move a few times, your arm should start gently moving side-to-side.  Swinging back and forth ? Move your body forwards and then backwards. After you move a few times, your arm should start gently moving back and forth.  Stretching Exercises   Stretching exercises keep your muscles flexible. They also stop them from getting tight. Start by doing each of these stretches 2 to 3 times. In order for your body to make changes, you will need to hold these stretches for 20 to 30 seconds. Try to do the stretches 2 to 3 times each day. Do all exercises slowly.  Strengthening Exercises   Strengthening exercises keep your muscles firm and strong. Start by repeating each exercise 2 to 3 times. Work up to doing each exercise 10 times. Try to do the exercises 2 to 3 times each day. Do all exercises slowly.  Shoulder blade squeezes ? Pinch your shoulder blades together on your upper back and hold 3 to 5 seconds. Relax.  Cane rehab exercises:  Overhead flexions ? Lie down and grab the cane with both hands. Start with your arms straight and the cane resting on your upper legs. Keep your arms straight and lift the cane over your head.  Abductions or side-to-side motion ? Stand and grab the cane with both hands. Turn your thumbs to the left to stretch your left shoulder. Start with your arms straight and the cane resting on your upper legs. Push the cane to the left, raising your left arm. Keep your left elbow straight. Keep pushing until you feel a good stretch. Switch the position of your  hands to point your thumbs to the right and push the cane right to stretch your right shoulder.  External rotations or side-to-side rotations ? Lie down and grab the cane with both hands. Start with your elbows bent and touching your body. Put the tip of the cane in the palm of your right hand to stretch your right shoulder. Grab the cane at the other end with your left hand. Push the cane sideways into your right palm until you feel a stretch in your shoulder. Be sure to keep your right elbow close to your body while you are stretching. Switch hands to stretch the left shoulder.  Internal rotations ? Stand up and grab the cane with both hands behind your back. With your palms facing backwards, slide the cane up your back. Go until you feel a good stretch in front of the shoulder.  Shoulder blade exercises ? Lie on your stomach near the edge of a mat or bed or supported on an exercise ball. Start with your arms hanging down in a relaxed position.  Y position ? Raise your arms up and to the sides so your elbows are near your ears and your arms are straight out diagonally like the letter Y. Lower the arms back to the starting position.  T position ? Raise your arms straight out to the sides, even with your shoulders. Lower the arms back to the starting position.  W position ? Raise your arms up and to the sides with your elbows bent. Your hands should be just above your shoulders and looks like a W from above. Lower the arms back to the starting position.  L position ? Keep your elbows at your sides and raise just your lower arms out to the side to make a letter L and a backwards letter L. Lower the arms back to the starting position.  Shoulder exercises ? Tie a knot in an exercise band. Secure the band in a door by shutting it in around waist level. Wrap the band around one hand for a good . Stand away from the door enough to have slight tension in the band. As the exercises get easier, you may need to change to  "a heavier band. Moving closer to, or farther away from, the point where the band is tied down will decrease or increase the tension in the band.  Internal rotations ? Face sideways with the arm holding the band closest to the door. Bend the elbow to 90 degrees or in an \"L\" position. Keeping your elbow by your side and bent, pull the band across your body. Bring it back to the starting position. Repeat with the other arm.  External rotations ? Face sideways with the arm holding the band farthest from the door. Bend the elbow to 90 degrees or in an \"L\" position. Keeping your elbow by your side and bent, pull the band away from your body. Bring it back to the starting position. Repeat with the other arm.  Abductions ? Face sideways with the arm holding the band farthest from the door. Be sure that your thumb is facing upward. Keep your elbow straight and pull the band out to the side and away from your body. Go up to shoulder level. Bring it back to the starting position. Repeat with the other arm.  Flexions ? Face away from the door. Keeping your elbow straight, pull the band straight out in front of you. Bring it back to the starting position. Repeat with the other arm.               What will the results be?   Less pain  More strength  Able to move your arm more  Easier to do daily activities  Shoulder is more stable  Prevent re-injury  Helpful tips   Stay active and work out to keep your muscles strong and flexible.  Eat a healthy diet to keep your muscles healthy.  Be sure you do not hold your breath when exercising. This can raise your blood pressure. If you tend to hold your breath, try counting out loud when exercising. If any exercise bothers you, stop right away.  Always warm up before stretching. Heated muscles stretch much easier than cool muscles. Stretching cool muscles can lead to injury.  Try swinging your arms at an easy pace for a few minutes to warm up your muscles. Do this again after " exercising.  Never bounce when doing stretches.  After exercising, it is a good idea to use ice. Place an ice pack or a bag of frozen peas wrapped in a towel over the painful part. Never put ice right on the skin. Do not leave the ice on more than 10 to 15 minutes at a time. Ice after activity may help decrease pain and swelling. Never ice before stretching.  Doing exercises before a meal may be a good way to get into a routine.  Exercise may be slightly uncomfortable, but you should not have sharp pains. If you do get sharp pains, stop what you are doing. If the sharp pains continue, call your doctor.  Last Reviewed Date   2020-03-10  Consumer Information Use and Disclaimer   This generalized information is a limited summary of diagnosis, treatment, and/or medication information. It is not meant to be comprehensive and should be used as a tool to help the user understand and/or assess potential diagnostic and treatment options. It does NOT include all information about conditions, treatments, medications, side effects, or risks that may apply to a specific patient. It is not intended to be medical advice or a substitute for the medical advice, diagnosis, or treatment of a health care provider based on the health care provider's examination and assessment of a patient’s specific and unique circumstances. Patients must speak with a health care provider for complete information about their health, medical questions, and treatment options, including any risks or benefits regarding use of medications. This information does not endorse any treatments or medications as safe, effective, or approved for treating a specific patient. [employer] and its affiliates disclaim any warranty or liability relating to this information or the use thereof. The use of this information is governed by the Terms of Use, available at https://www."[employer]terscanvs.couwer.com/en/know/clinical-effectiveness-terms   Copyright   Copyright © 2024  [employer] and its affiliates and/or licensors. All rights reserved.
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
## 2025-06-09 ENCOUNTER — APPOINTMENT (OUTPATIENT)
Dept: RADIOLOGY | Age: 45
End: 2025-06-09
Attending: EMERGENCY MEDICINE
Payer: COMMERCIAL

## 2025-06-09 ENCOUNTER — OFFICE VISIT (OUTPATIENT)
Dept: URGENT CARE | Age: 45
End: 2025-06-09
Payer: COMMERCIAL

## 2025-06-09 VITALS
DIASTOLIC BLOOD PRESSURE: 98 MMHG | OXYGEN SATURATION: 98 % | TEMPERATURE: 97.6 F | SYSTOLIC BLOOD PRESSURE: 152 MMHG | RESPIRATION RATE: 18 BRPM | HEART RATE: 96 BPM

## 2025-06-09 DIAGNOSIS — M25.512 ACUTE PAIN OF LEFT SHOULDER: Primary | ICD-10-CM

## 2025-06-09 LAB — GLUCOSE SERPL-MCNC: 67 MG/DL (ref 65–140)

## 2025-06-09 PROCEDURE — 73030 X-RAY EXAM OF SHOULDER: CPT

## 2025-06-09 PROCEDURE — 99214 OFFICE O/P EST MOD 30 MIN: CPT | Performed by: EMERGENCY MEDICINE

## 2025-06-09 PROCEDURE — 82948 REAGENT STRIP/BLOOD GLUCOSE: CPT | Performed by: EMERGENCY MEDICINE

## 2025-06-11 ENCOUNTER — OFFICE VISIT (OUTPATIENT)
Dept: OBGYN CLINIC | Facility: CLINIC | Age: 45
End: 2025-06-11
Payer: COMMERCIAL

## 2025-06-11 VITALS — WEIGHT: 145 LBS | BODY MASS INDEX: 20.3 KG/M2 | HEIGHT: 71 IN

## 2025-06-11 DIAGNOSIS — M25.532 PAIN IN LEFT WRIST: ICD-10-CM

## 2025-06-11 DIAGNOSIS — M75.82 ROTATOR CUFF TENDINITIS, LEFT: ICD-10-CM

## 2025-06-11 DIAGNOSIS — M54.2 CERVICALGIA: ICD-10-CM

## 2025-06-11 DIAGNOSIS — M65.4 DE QUERVAIN'S TENOSYNOVITIS, LEFT: Primary | ICD-10-CM

## 2025-06-11 DIAGNOSIS — M25.512 ACUTE PAIN OF LEFT SHOULDER: ICD-10-CM

## 2025-06-11 PROCEDURE — 99214 OFFICE O/P EST MOD 30 MIN: CPT | Performed by: FAMILY MEDICINE

## 2025-06-11 RX ORDER — PREDNISONE 10 MG/1
10 TABLET ORAL DAILY
Qty: 42 TABLET | Refills: 0 | Status: SHIPPED | OUTPATIENT
Start: 2025-06-11
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
## 2025-07-24 ENCOUNTER — OFFICE VISIT (OUTPATIENT)
Dept: URGENT CARE | Age: 45
End: 2025-07-24
Payer: COMMERCIAL

## 2025-07-24 VITALS
SYSTOLIC BLOOD PRESSURE: 142 MMHG | OXYGEN SATURATION: 99 % | DIASTOLIC BLOOD PRESSURE: 78 MMHG | HEART RATE: 110 BPM | RESPIRATION RATE: 18 BRPM | TEMPERATURE: 98.2 F

## 2025-07-24 DIAGNOSIS — M25.562 ACUTE PAIN OF LEFT KNEE: Primary | ICD-10-CM

## 2025-07-24 PROCEDURE — 99213 OFFICE O/P EST LOW 20 MIN: CPT | Performed by: PHYSICIAN ASSISTANT

## 2025-07-24 RX ORDER — PREDNISONE 10 MG/1
TABLET ORAL
Qty: 26 TABLET | Refills: 0 | Status: SHIPPED | OUTPATIENT
Start: 2025-07-24

## 2025-07-24 RX ORDER — SULFAMETHOXAZOLE AND TRIMETHOPRIM 800; 160 MG/1; MG/1
1 TABLET ORAL EVERY 12 HOURS SCHEDULED
Qty: 14 TABLET | Refills: 0 | Status: SHIPPED | OUTPATIENT
Start: 2025-07-24 | End: 2025-07-31

## 2025-07-24 RX ORDER — CEPHALEXIN 500 MG/1
500 CAPSULE ORAL EVERY 8 HOURS SCHEDULED
Qty: 21 CAPSULE | Refills: 0 | Status: SHIPPED | OUTPATIENT
Start: 2025-07-24 | End: 2025-07-31

## 2025-07-24 NOTE — LETTER
July 24, 2025     Patient: Richy Barraza  YOB: 1980  Date of Visit: 7/24/2025      To Whom it May Concern:    Richy Barraza is under my professional care. Richy was seen in my office on 7/24/2025. Richy may return to work on 7/26.    If you have any questions or concerns, please don't hesitate to call.         Sincerely,          Denise Franco PA-C        CC: No Recipients
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
## 2025-07-30 VITALS — HEIGHT: 71 IN | BODY MASS INDEX: 20.3 KG/M2 | WEIGHT: 145 LBS

## 2025-07-30 DIAGNOSIS — M25.562 ACUTE PAIN OF LEFT KNEE: ICD-10-CM

## 2025-07-30 DIAGNOSIS — M70.42 PREPATELLAR BURSITIS OF LEFT KNEE: Primary | ICD-10-CM

## 2025-07-30 PROCEDURE — 99214 OFFICE O/P EST MOD 30 MIN: CPT | Performed by: FAMILY MEDICINE

## 2025-07-30 RX ORDER — CEPHALEXIN 500 MG/1
500 CAPSULE ORAL 4 TIMES DAILY
Qty: 28 CAPSULE | Refills: 0 | Status: SHIPPED | OUTPATIENT
Start: 2025-07-30 | End: 2025-08-06

## 2025-08-07 ENCOUNTER — OFFICE VISIT (OUTPATIENT)
Age: 45
End: 2025-08-07
Payer: COMMERCIAL

## 2025-08-07 ENCOUNTER — APPOINTMENT (OUTPATIENT)
Age: 45
End: 2025-08-07
Attending: ORTHOPAEDIC SURGERY
Payer: COMMERCIAL

## 2025-08-07 DIAGNOSIS — M25.572 PAIN, JOINT, ANKLE AND FOOT, LEFT: ICD-10-CM

## 2025-08-07 DIAGNOSIS — M70.42 PREPATELLAR BURSITIS OF LEFT KNEE: Primary | ICD-10-CM

## 2025-08-07 DIAGNOSIS — Z01.89 ENCOUNTER FOR LOWER EXTREMITY COMPARISON IMAGING STUDY: ICD-10-CM

## 2025-08-07 DIAGNOSIS — M25.562 ACUTE PAIN OF LEFT KNEE: ICD-10-CM

## 2025-08-07 PROCEDURE — 99214 OFFICE O/P EST MOD 30 MIN: CPT | Performed by: ORTHOPAEDIC SURGERY

## 2025-08-07 RX ORDER — CEPHALEXIN 500 MG/1
500 CAPSULE ORAL EVERY 6 HOURS SCHEDULED
Qty: 12 CAPSULE | Refills: 0 | Status: SHIPPED | OUTPATIENT
Start: 2025-08-07 | End: 2025-08-10